# Patient Record
Sex: MALE | Race: WHITE | Employment: UNEMPLOYED | URBAN - METROPOLITAN AREA
[De-identification: names, ages, dates, MRNs, and addresses within clinical notes are randomized per-mention and may not be internally consistent; named-entity substitution may affect disease eponyms.]

---

## 2017-02-27 ENCOUNTER — HOSPITAL ENCOUNTER (INPATIENT)
Age: 47
LOS: 4 days | Discharge: HOME | DRG: 812 | End: 2017-03-04
Attending: EMERGENCY MEDICINE | Admitting: INTERNAL MEDICINE
Payer: COMMERCIAL

## 2017-02-27 ENCOUNTER — APPOINTMENT (OUTPATIENT)
Dept: GENERAL RADIOLOGY | Age: 47
DRG: 812 | End: 2017-02-27
Attending: EMERGENCY MEDICINE
Payer: COMMERCIAL

## 2017-02-27 DIAGNOSIS — S80.10XA TRAUMATIC ECCHYMOSIS OF LOWER LEG, UNSPECIFIED LATERALITY, INITIAL ENCOUNTER: ICD-10-CM

## 2017-02-27 DIAGNOSIS — D64.9 SEVERE ANEMIA: Primary | ICD-10-CM

## 2017-02-27 PROBLEM — R23.3 PETECHIAL RASH: Status: ACTIVE | Noted: 2017-02-27

## 2017-02-27 PROBLEM — T14.8XXA BRUISING: Status: ACTIVE | Noted: 2017-02-27

## 2017-02-27 LAB
ALBUMIN SERPL BCP-MCNC: 3.2 G/DL (ref 3.5–5)
ALBUMIN/GLOB SERPL: 0.5 {RATIO} (ref 1.2–3.5)
ALP SERPL-CCNC: 142 U/L (ref 50–136)
ALT SERPL-CCNC: 23 U/L (ref 12–65)
ANION GAP BLD CALC-SCNC: 10 MMOL/L (ref 7–16)
APTT PPP: 27 SEC (ref 25.3–32.9)
AST SERPL W P-5'-P-CCNC: 22 U/L (ref 15–37)
BASOPHILS # BLD AUTO: 0 K/UL (ref 0–0.2)
BASOPHILS # BLD: 0 % (ref 0–2)
BILIRUB SERPL-MCNC: 0.6 MG/DL (ref 0.2–1.1)
BUN SERPL-MCNC: 17 MG/DL (ref 6–23)
CALCIUM SERPL-MCNC: 9.5 MG/DL (ref 8.3–10.4)
CHLORIDE SERPL-SCNC: 96 MMOL/L (ref 98–107)
CK SERPL-CCNC: 67 U/L (ref 21–215)
CO2 SERPL-SCNC: 26 MMOL/L (ref 21–32)
CREAT SERPL-MCNC: 1.1 MG/DL (ref 0.8–1.5)
DIFFERENTIAL METHOD BLD: ABNORMAL
EOSINOPHIL # BLD: 0 K/UL (ref 0–0.8)
EOSINOPHIL NFR BLD: 0 % (ref 0.5–7.8)
ERYTHROCYTE [DISTWIDTH] IN BLOOD BY AUTOMATED COUNT: 16.6 % (ref 11.9–14.6)
FLUAV AG NPH QL IA: NEGATIVE
FLUBV AG NPH QL IA: NEGATIVE
GLOBULIN SER CALC-MCNC: 5.9 G/DL (ref 2.3–3.5)
GLUCOSE SERPL-MCNC: 113 MG/DL (ref 65–100)
HCT VFR BLD AUTO: 21.1 % (ref 41.1–50.3)
HGB BLD-MCNC: 6.3 G/DL (ref 13.6–17.2)
INR PPP: 1.1 (ref 0.9–1.2)
LACTATE BLD-SCNC: 3.7 MMOL/L (ref 0.5–1.9)
LYMPHOCYTES # BLD AUTO: 9 % (ref 13–44)
LYMPHOCYTES # BLD: 1.5 K/UL (ref 0.5–4.6)
MCH RBC QN AUTO: 23.2 PG (ref 26.1–32.9)
MCHC RBC AUTO-ENTMCNC: 29.9 G/DL (ref 31.4–35)
MCV RBC AUTO: 77.9 FL (ref 79.6–97.8)
MONOCYTES # BLD: 0.7 K/UL (ref 0.1–1.3)
MONOCYTES NFR BLD AUTO: 4 % (ref 4–12)
NEUTS SEG # BLD: 14.3 K/UL (ref 1.7–8.2)
NEUTS SEG NFR BLD AUTO: 87 % (ref 43–78)
PLATELET # BLD AUTO: 540 K/UL (ref 150–450)
PLATELET COMMENTS,PCOM: ABNORMAL
PMV BLD AUTO: 9.2 FL (ref 10.8–14.1)
POTASSIUM SERPL-SCNC: 5 MMOL/L (ref 3.5–5.1)
PROT SERPL-MCNC: 9.1 G/DL (ref 6.3–8.2)
PROTHROMBIN TIME: 12 SEC (ref 9.6–12)
RBC # BLD AUTO: 2.71 M/UL (ref 4.23–5.67)
RBC MORPH BLD: ABNORMAL
RBC MORPH BLD: ABNORMAL
SODIUM SERPL-SCNC: 132 MMOL/L (ref 136–145)
TSH SERPL DL<=0.005 MIU/L-ACNC: 0.79 UIU/ML (ref 0.36–3.74)
WBC # BLD AUTO: 16.5 K/UL (ref 4.3–11.1)

## 2017-02-27 PROCEDURE — 74011250636 HC RX REV CODE- 250/636: Performed by: EMERGENCY MEDICINE

## 2017-02-27 PROCEDURE — P9016 RBC LEUKOCYTES REDUCED: HCPCS | Performed by: EMERGENCY MEDICINE

## 2017-02-27 PROCEDURE — 84443 ASSAY THYROID STIM HORMONE: CPT | Performed by: EMERGENCY MEDICINE

## 2017-02-27 PROCEDURE — 82550 ASSAY OF CK (CPK): CPT | Performed by: EMERGENCY MEDICINE

## 2017-02-27 PROCEDURE — 86923 COMPATIBILITY TEST ELECTRIC: CPT | Performed by: EMERGENCY MEDICINE

## 2017-02-27 PROCEDURE — 83540 ASSAY OF IRON: CPT | Performed by: INTERNAL MEDICINE

## 2017-02-27 PROCEDURE — 87804 INFLUENZA ASSAY W/OPTIC: CPT | Performed by: EMERGENCY MEDICINE

## 2017-02-27 PROCEDURE — 85025 COMPLETE CBC W/AUTO DIFF WBC: CPT | Performed by: EMERGENCY MEDICINE

## 2017-02-27 PROCEDURE — 86900 BLOOD TYPING SEROLOGIC ABO: CPT | Performed by: EMERGENCY MEDICINE

## 2017-02-27 PROCEDURE — 36430 TRANSFUSION BLD/BLD COMPNT: CPT

## 2017-02-27 PROCEDURE — 99285 EMERGENCY DEPT VISIT HI MDM: CPT | Performed by: EMERGENCY MEDICINE

## 2017-02-27 PROCEDURE — 82728 ASSAY OF FERRITIN: CPT | Performed by: INTERNAL MEDICINE

## 2017-02-27 PROCEDURE — 80053 COMPREHEN METABOLIC PANEL: CPT | Performed by: EMERGENCY MEDICINE

## 2017-02-27 PROCEDURE — 74011250637 HC RX REV CODE- 250/637: Performed by: EMERGENCY MEDICINE

## 2017-02-27 PROCEDURE — 85610 PROTHROMBIN TIME: CPT | Performed by: EMERGENCY MEDICINE

## 2017-02-27 PROCEDURE — 85730 THROMBOPLASTIN TIME PARTIAL: CPT | Performed by: EMERGENCY MEDICINE

## 2017-02-27 PROCEDURE — 83605 ASSAY OF LACTIC ACID: CPT

## 2017-02-27 PROCEDURE — 71010 XR CHEST PORT: CPT

## 2017-02-27 PROCEDURE — 83615 LACTATE (LD) (LDH) ENZYME: CPT | Performed by: INTERNAL MEDICINE

## 2017-02-27 PROCEDURE — 83010 ASSAY OF HAPTOGLOBIN QUANT: CPT | Performed by: INTERNAL MEDICINE

## 2017-02-27 RX ORDER — OXYCODONE AND ACETAMINOPHEN 10; 325 MG/1; MG/1
2 TABLET ORAL
COMMUNITY
End: 2017-03-04

## 2017-02-27 RX ORDER — ONDANSETRON 2 MG/ML
4 INJECTION INTRAMUSCULAR; INTRAVENOUS
Status: COMPLETED | OUTPATIENT
Start: 2017-02-27 | End: 2017-02-27

## 2017-02-27 RX ORDER — SODIUM CHLORIDE 9 MG/ML
250 INJECTION, SOLUTION INTRAVENOUS AS NEEDED
Status: DISCONTINUED | OUTPATIENT
Start: 2017-02-27 | End: 2017-03-04 | Stop reason: HOSPADM

## 2017-02-27 RX ORDER — OXYCODONE AND ACETAMINOPHEN 10; 325 MG/1; MG/1
1 TABLET ORAL ONCE
Status: COMPLETED | OUTPATIENT
Start: 2017-02-27 | End: 2017-02-27

## 2017-02-27 RX ORDER — TIZANIDINE 4 MG/1
4 TABLET ORAL EVERY 4 HOURS
COMMUNITY

## 2017-02-27 RX ORDER — MORPHINE SULFATE 4 MG/ML
4 INJECTION, SOLUTION INTRAMUSCULAR; INTRAVENOUS
Status: COMPLETED | OUTPATIENT
Start: 2017-02-27 | End: 2017-02-27

## 2017-02-27 RX ORDER — PROPRANOLOL HYDROCHLORIDE 120 MG/1
120 CAPSULE, EXTENDED RELEASE ORAL
COMMUNITY

## 2017-02-27 RX ORDER — SODIUM CHLORIDE 0.9 % (FLUSH) 0.9 %
5-10 SYRINGE (ML) INJECTION AS NEEDED
Status: DISCONTINUED | OUTPATIENT
Start: 2017-02-27 | End: 2017-03-04 | Stop reason: HOSPADM

## 2017-02-27 RX ORDER — PROMETHAZINE HYDROCHLORIDE 25 MG/1
25 TABLET ORAL
COMMUNITY

## 2017-02-27 RX ORDER — MORPHINE SULFATE 10 MG/ML
5 INJECTION, SOLUTION INTRAMUSCULAR; INTRAVENOUS
Status: DISCONTINUED | OUTPATIENT
Start: 2017-02-27 | End: 2017-02-28

## 2017-02-27 RX ORDER — SODIUM CHLORIDE 0.9 % (FLUSH) 0.9 %
5-10 SYRINGE (ML) INJECTION EVERY 8 HOURS
Status: DISCONTINUED | OUTPATIENT
Start: 2017-02-27 | End: 2017-03-04 | Stop reason: HOSPADM

## 2017-02-27 RX ORDER — RABEPRAZOLE SODIUM 20 MG/1
20 TABLET, DELAYED RELEASE ORAL DAILY
COMMUNITY

## 2017-02-27 RX ADMIN — OXYCODONE HYDROCHLORIDE AND ACETAMINOPHEN 1 TABLET: 10; 325 TABLET ORAL at 22:10

## 2017-02-27 RX ADMIN — MORPHINE SULFATE 4 MG: 4 INJECTION, SOLUTION INTRAMUSCULAR; INTRAVENOUS at 23:24

## 2017-02-27 RX ADMIN — ONDANSETRON HYDROCHLORIDE 4 MG: 2 SOLUTION INTRAMUSCULAR; INTRAVENOUS at 23:23

## 2017-02-27 NOTE — IP AVS SNAPSHOT
Andrew Mendiola 
 
 
 [address] [id number] [facility] 
[phone] Patient: Abby Das MRN: [id number] :1970 You are allergic to the following Allergen Reactions Amitriptyline Other (comments)  
 hallucinations Reglan (Metoclopramide) Other (comments) Problems breathing , ,jaw locks Recent Documentation Height Weight BMI Smoking Status 1.702 m 95.3 kg 32.89 kg/m2 Never Smoker Unresulted Labs Order Current Status LUPUS ANTICOAG PANEL In process CULTURE, BLOOD Preliminary result CULTURE, BLOOD Preliminary result Emergency Contacts Name Discharge Info Relation Home Work Mobile Radha Miller  Spouse [3] [phone] About your hospitalization You were admitted on:  2017 You last received care in the:  [facility] You were discharged on:  2017 Unit phone number:  [phone] Why you were hospitalized Your primary diagnosis was:  Symptomatic Anemia Your diagnoses also included:  Petechial Rash, Bruising, Hypothyroidism, Hyponatremia, Hypertension, Pain In Both Lower Extremities, Anemia Providers Seen During Your Hospitalizations Provider Role Specialty Primary office phone Jos Herrera MD Attending Provider Emergency Medicine [phone] Alexander Cruz MD Attending Provider Emergency Medicine [phone] Fab Kilpatrick MD Attending Provider Emergency Medicine [phone] Mandi Pickard MD Attending Provider Internal Medicine [phone] Your Primary Care Physician (PCP) Primary Care Physician Office Phone Office Fax UNKNOWN, PROVIDER ** None ** ** None ** Follow-up Information Follow up With Details Comments Contact Info MD Modesto Leyva  [facility] [address] [place] [address] 
[phone] Provider Unknown   Patient not available to ask Current Discharge Medication List  
  
START taking these medications Dose & Instructions Dispensing Information Comments Morning Noon Evening Bedtime  
 0.9% sodium chloride solp 250 mL with ferric carboxymaltose 50 iron mg/mL soln 750 mg  
Start taking on:  3/8/2017 Your next dose is: Today, Tomorrow Other:  _________ Dose:  750 mg  
750 mg by IntraVENous route once for 1 dose. Quantity:  1 Dose Refills:  0  
     
   
   
   
  
 acetaminophen 325 mg tablet Commonly known as:  TYLENOL Your next dose is: Today, Tomorrow Other:  _________ Dose:  650 mg Take 2 Tabs by mouth every four (4) hours (while awake). Do not take more than 6 pills daily Quantity:  60 Tab Refills:  1  
     
   
   
   
  
 calcium carbonate 200 mg calcium (500 mg) Chew Commonly known as:  TUMS Your next dose is: Today, Tomorrow Other:  _________ Dose:  2 Tab Take 2 Tabs by mouth four (4) times daily (with meals) for 7 days. Quantity:  112 Tab Refills:  1  
     
   
   
   
  
 gabapentin 300 mg capsule Commonly known as:  NEURONTIN Your next dose is: Today, Tomorrow Other:  _________ Dose:  300 mg Take 1 Cap by mouth every eight (8) hours for 30 days. Quantity:  90 Cap Refills:  0 HYDROmorphone ER 12 mg tablet Commonly known asWaunita Cirri ER Your next dose is: Today, Tomorrow Other:  _________ Dose:  12 mg Take 1 Tab by mouth daily. Max Daily Amount: 12 mg. Quantity:  30 Tab Refills:  0  
     
   
   
   
  
 oxyCODONE IR 10 mg Tab immediate release tablet Commonly known as:  Cheryln Records Your next dose is: Today, Tomorrow Other:  _________  
   
   
 1- 2 tabs po q4hr prn pain Quantity:  180 Tab Refills:  0 CONTINUE these medications which have CHANGED Dose & Instructions Dispensing Information Comments Morning Noon Evening Bedtime  
 amLODIPine 10 mg tablet Commonly known as:  Nighat Christian What changed:  when to take this Your next dose is: Today, Tomorrow Other:  _________ Dose:  10 mg Take 1 Tab by mouth daily. Quantity:  30 Tab Refills:  0 CONTINUE these medications which have NOT CHANGED Dose & Instructions Dispensing Information Comments Morning Noon Evening Bedtime ACIPHEX 20 mg tablet Generic drug:  RABEprazole Your next dose is: Today, Tomorrow Other:  _________ Dose:  20 mg Take 20 mg by mouth daily. Indications: HEARTBURN Refills:  0 INDERAL  mg SR capsule Generic drug:  propranolol LA Your next dose is: Today, Tomorrow Other:  _________ Dose:  120 mg Take 120 mg by mouth nightly. Indications: hypertension Refills:  0  
     
   
   
   
  
 promethazine 25 mg tablet Commonly known as:  PHENERGAN Your next dose is: Today, Tomorrow Other:  _________ Dose:  25 mg Take 25 mg by mouth every six (6) hours as needed for Nausea. Refills:  0  
     
   
   
   
  
 SYNTHROID 100 mcg tablet Generic drug:  levothyroxine Your next dose is: Today, Tomorrow Other:  _________ Dose:  200 mcg Take 200 mcg by mouth nightly. Indications: hypothyroidism Refills:  0  
     
   
   
   
  
 temazepam 30 mg capsule Commonly known as:  RESTORIL Your next dose is: Today, Tomorrow Other:  _________ Dose:  30 mg Take 30 mg by mouth nightly. Indications: INSOMNIA Refills:  0  
     
   
   
   
  
 ZANAFLEX 4 mg tablet Generic drug:  tiZANidine Your next dose is: Today, Tomorrow Other:  _________ Dose:  4 mg Take 4 mg by mouth every four (4) hours. Indications: MUSCLE SPASM Refills:  0 STOP taking these medications PERCOCET  mg per tablet Generic drug:  oxyCODONE-acetaminophen Where to Get Your Medications Information on where to get these meds will be given to you by the nurse or doctor. ! Ask your nurse or doctor about these medications  
  0.9% sodium chloride solp 250 mL with ferric carboxymaltose 50 iron mg/mL soln 750 mg  
 acetaminophen 325 mg tablet  
 calcium carbonate 200 mg calcium (500 mg) Chew  
 gabapentin 300 mg capsule HYDROmorphone ER 12 mg tablet  
 oxyCODONE IR 10 mg Tab immediate release tablet Discharge Instructions Anemia: Care Instructions Your Care Instructions Anemia is a low level of red blood cells, which carry oxygen throughout your body. Many things can cause anemia. Lack of iron is one of the most common causes. Your body needs iron to make hemoglobin, a substance in red blood cells that carries oxygen from the lungs to your body's cells. Without enough iron, the body produces fewer and smaller red blood cells. As a result, your body's cells do not get enough oxygen, and you feel tired and weak. And you may have trouble concentrating. Bleeding is the most common cause of a lack of iron. You may have heavy menstrual bleeding or bleeding caused by conditions such as ulcers, hemorrhoids, or cancer. Regular use of aspirin or other anti-inflammatory medicines (such as ibuprofen) also can cause bleeding in some people. A lack of iron in your diet also can cause anemia, especially at times when the body needs more iron, such as during pregnancy, infancy, and the teen years. Your doctor may have prescribed iron pills. It may take several months of treatment for your iron levels to return to normal. Your doctor also may suggest that you eat foods that are rich in iron, such as meat and beans. There are many other causes of anemia. It is not always due to a lack of iron. Finding the specific cause of your anemia will help your doctor find the right treatment for you. Follow-up care is a key part of your treatment and safety. Be sure to make and go to all appointments, and call your doctor if you are having problems. It's also a good idea to know your test results and keep a list of the medicines you take. How can you care for yourself at home? · Take your medicines exactly as prescribed. Call your doctor if you think you are having a problem with your medicine. · If your doctor recommends iron pills, take them as directed: ¨ Try to take the pills on an empty stomach about 1 hour before or 2 hours after meals. But you may need to take iron with food to avoid an upset stomach. ¨ Do not take antacids or drink milk or caffeine drinks (such as coffee, tea, or cola) at the same time or within 2 hours of the time that you take your iron. They can make it hard for your body to absorb the iron. ¨ Vitamin C (from food or supplements) helps your body absorb iron. Try taking iron pills with a glass of orange juice or some other food that is high in vitamin C, such as citrus fruits. ¨ Iron pills may cause stomach problems, such as heartburn, nausea, diarrhea, constipation, and cramps. Be sure to drink plenty of fluids, and include fruits, vegetables, and fiber in your diet each day. Iron pills often make your bowel movements dark or green. ¨ If you forget to take an iron pill, do not take a double dose of iron the next time you take a pill. ¨ Keep iron pills out of the reach of small children. An overdose of iron can be very dangerous. · Follow your doctor's advice about eating iron-rich foods. These include red meat, shellfish, poultry, eggs, beans, raisins, whole-grain bread, and leafy green vegetables. · Steam vegetables to help them keep their iron content. When should you call for help? Call 911 anytime you think you may need emergency care. For example, call if: 
· You have symptoms of a heart attack. These may include: ¨ Chest pain or pressure, or a strange feeling in the chest. 
¨ Sweating. ¨ Shortness of breath. ¨ Nausea or vomiting. ¨ Pain, pressure, or a strange feeling in the back, neck, jaw, or upper belly or in one or both shoulders or arms. ¨ Lightheadedness or sudden weakness. ¨ A fast or irregular heartbeat. After you call 911, the  may tell you to chew 1 adult-strength or 2 to 4 low-dose aspirin. Wait for an ambulance. Do not try to drive yourself. · You passed out (lost consciousness). Call your doctor now or seek immediate medical care if: 
· You have new or increased shortness of breath. · You are dizzy or lightheaded, or you feel like you may faint. · Your fatigue and weakness continue or get worse. · You have any abnormal bleeding, such as: 
¨ Nosebleeds. ¨ Vaginal bleeding that is different (heavier, more frequent, at a different time of the month) than what you are used to. ¨ Bloody or black stools, or rectal bleeding. ¨ Bloody or pink urine. Watch closely for changes in your health, and be sure to contact your doctor if: 
· You do not get better as expected. Where can you learn more? Go to http://denise-marce.info/. Enter R301 in the search box to learn more about \"Anemia: Care Instructions. \" Current as of: February 5, 2016 Content Version: 11.1 © 5581-2584 [employer]. Care instructions adapted under license by [employer] (which disclaims liability or warranty for this information). If you have questions about a medical condition or this instruction, always ask your healthcare professional. [address] [address] any warranty or liability for your use of this information. Discharge Orders None [facility] Announcement We are excited to announce that we are making your provider's discharge notes available to you in [employer]. You will see these notes when they are completed and signed by the physician that discharged you from your recent hospital stay. If you have any questions or concerns about any information you see in [employer], please call the Health Information Department where you were seen or reach out to your Primary Care Provider for more information about your plan of care. Introducing \[facility]\"" & HEALTH SERVICES! Ariadne Mclaughlin introduces [employer] patient portal. Now you can access parts of your medical record, email your doctor's office, and request medication refills online. 1. In your internet browser, go to [email or web address] 2. Click on the First Time User? Click Here link in the Sign In box. You will see the New Member Sign Up page. 3. Enter your AlignMed Access Code exactly as it appears below. You will not need to use this code after youve completed the sign-up process. If you do not sign up before the expiration date, you must request a new code. · [employer] Access Code: [email or web address] Expires: 5/28/2017  9:41 PM 
 
4. Enter the last four digits of your Social Security Number (xxxx) and Date of Birth (mm/dd/yyyy) as indicated and click Submit. You will be taken to the next sign-up page. 5. Create a AlignMed ID. This will be your [employer] login ID and cannot be changed, so think of one that is secure and easy to remember. 6. Create a [employer] password. You can change your password at any time. 7. Enter your Password Reset Question and Answer. This can be used at a later time if you forget your password. 8. Enter your e-mail address. You will receive e-mail notification when new information is available in [address] 9. Click Sign Up. You can now view and download portions of your medical record.  
10. Click the Download Summary menu link to download a portable copy of your medical information. If you have questions, please visit the Frequently Asked Questions section of the [employer] website. Remember, MyChart is NOT to be used for urgent needs. For medical emergencies, dial 911. Now available from your iPhone and Android! General Information Please provide this summary of care documentation to your next provider. Patient Signature:  ____________________________________________________________ Date:  ____________________________________________________________  
  
[facility] Provider Signature:  ____________________________________________________________ Date:  ____________________________________________________________

## 2017-02-28 LAB
FERRITIN SERPL-MCNC: 46 NG/ML (ref 8–388)
HAPTOGLOB SERPL-MCNC: 495 MG/DL (ref 30–200)
HGB BLD-MCNC: 10.7 G/DL (ref 13.6–17.2)
HGB BLD-MCNC: 9 G/DL (ref 13.6–17.2)
IRON SATN MFR SERPL: 6 %
IRON SERPL-MCNC: 18 UG/DL (ref 35–150)
LACTATE SERPL-SCNC: 1.6 MMOL/L (ref 0.4–2)
LDH SERPL L TO P-CCNC: 394 U/L (ref 100–190)
TIBC SERPL-MCNC: 305 UG/DL (ref 250–450)

## 2017-02-28 PROCEDURE — 74011250637 HC RX REV CODE- 250/637: Performed by: INTERNAL MEDICINE

## 2017-02-28 PROCEDURE — 74011250636 HC RX REV CODE- 250/636: Performed by: INTERNAL MEDICINE

## 2017-02-28 PROCEDURE — 83605 ASSAY OF LACTIC ACID: CPT | Performed by: INTERNAL MEDICINE

## 2017-02-28 PROCEDURE — P9016 RBC LEUKOCYTES REDUCED: HCPCS | Performed by: EMERGENCY MEDICINE

## 2017-02-28 PROCEDURE — 36430 TRANSFUSION BLD/BLD COMPNT: CPT

## 2017-02-28 PROCEDURE — 74011250636 HC RX REV CODE- 250/636: Performed by: NURSE PRACTITIONER

## 2017-02-28 PROCEDURE — 77030032490 HC SLV COMPR SCD KNE COVD -B

## 2017-02-28 PROCEDURE — 74011250637 HC RX REV CODE- 250/637

## 2017-02-28 PROCEDURE — 65270000029 HC RM PRIVATE

## 2017-02-28 PROCEDURE — 85018 HEMOGLOBIN: CPT | Performed by: EMERGENCY MEDICINE

## 2017-02-28 PROCEDURE — 74011250636 HC RX REV CODE- 250/636: Performed by: EMERGENCY MEDICINE

## 2017-02-28 PROCEDURE — 87040 BLOOD CULTURE FOR BACTERIA: CPT | Performed by: EMERGENCY MEDICINE

## 2017-02-28 PROCEDURE — 36415 COLL VENOUS BLD VENIPUNCTURE: CPT | Performed by: INTERNAL MEDICINE

## 2017-02-28 PROCEDURE — 74011000258 HC RX REV CODE- 258: Performed by: EMERGENCY MEDICINE

## 2017-02-28 PROCEDURE — 30233N1 TRANSFUSION OF NONAUTOLOGOUS RED BLOOD CELLS INTO PERIPHERAL VEIN, PERCUTANEOUS APPROACH: ICD-10-PCS | Performed by: INTERNAL MEDICINE

## 2017-02-28 RX ORDER — MORPHINE SULFATE 100 MG/5ML
10 SOLUTION ORAL
Status: DISCONTINUED | OUTPATIENT
Start: 2017-02-28 | End: 2017-02-28 | Stop reason: SDUPTHER

## 2017-02-28 RX ORDER — HYDRALAZINE HYDROCHLORIDE 20 MG/ML
20 INJECTION INTRAMUSCULAR; INTRAVENOUS ONCE
Status: COMPLETED | OUTPATIENT
Start: 2017-02-28 | End: 2017-02-28

## 2017-02-28 RX ORDER — PROMETHAZINE HYDROCHLORIDE 25 MG/1
25 TABLET ORAL
Status: DISCONTINUED | OUTPATIENT
Start: 2017-02-28 | End: 2017-03-04 | Stop reason: HOSPADM

## 2017-02-28 RX ORDER — MORPHINE SULFATE 30 MG/1
90 TABLET, FILM COATED, EXTENDED RELEASE ORAL EVERY 12 HOURS
Status: DISCONTINUED | OUTPATIENT
Start: 2017-02-28 | End: 2017-02-28 | Stop reason: DRUGHIGH

## 2017-02-28 RX ORDER — HYDROMORPHONE HYDROCHLORIDE 1 MG/ML
1 INJECTION, SOLUTION INTRAMUSCULAR; INTRAVENOUS; SUBCUTANEOUS
Status: DISCONTINUED | OUTPATIENT
Start: 2017-02-28 | End: 2017-02-28

## 2017-02-28 RX ORDER — HYDROMORPHONE HYDROCHLORIDE 1 MG/ML
2 INJECTION, SOLUTION INTRAMUSCULAR; INTRAVENOUS; SUBCUTANEOUS
Status: DISCONTINUED | OUTPATIENT
Start: 2017-02-28 | End: 2017-02-28

## 2017-02-28 RX ORDER — HYDROMORPHONE HYDROCHLORIDE 1 MG/ML
1 INJECTION, SOLUTION INTRAMUSCULAR; INTRAVENOUS; SUBCUTANEOUS
Status: DISCONTINUED | OUTPATIENT
Start: 2017-02-28 | End: 2017-03-03

## 2017-02-28 RX ORDER — SODIUM CHLORIDE 9 MG/ML
75 INJECTION, SOLUTION INTRAVENOUS CONTINUOUS
Status: DISCONTINUED | OUTPATIENT
Start: 2017-02-28 | End: 2017-03-04 | Stop reason: HOSPADM

## 2017-02-28 RX ORDER — MORPHINE SULFATE 100 MG/1
100 TABLET, FILM COATED, EXTENDED RELEASE ORAL EVERY 12 HOURS
Status: DISCONTINUED | OUTPATIENT
Start: 2017-02-28 | End: 2017-02-28

## 2017-02-28 RX ORDER — AMLODIPINE BESYLATE 10 MG/1
10 TABLET ORAL
Status: DISCONTINUED | OUTPATIENT
Start: 2017-02-28 | End: 2017-03-04 | Stop reason: HOSPADM

## 2017-02-28 RX ORDER — ONDANSETRON 2 MG/ML
4 INJECTION INTRAMUSCULAR; INTRAVENOUS
Status: DISCONTINUED | OUTPATIENT
Start: 2017-02-28 | End: 2017-03-04 | Stop reason: HOSPADM

## 2017-02-28 RX ORDER — TIZANIDINE 2 MG/1
4 TABLET ORAL EVERY 6 HOURS
Status: DISCONTINUED | OUTPATIENT
Start: 2017-02-28 | End: 2017-03-04 | Stop reason: HOSPADM

## 2017-02-28 RX ORDER — PROMETHAZINE HYDROCHLORIDE 25 MG/1
TABLET ORAL
Status: COMPLETED
Start: 2017-02-28 | End: 2017-02-28

## 2017-02-28 RX ORDER — TEMAZEPAM 15 MG/1
30 CAPSULE ORAL
Status: DISCONTINUED | OUTPATIENT
Start: 2017-02-28 | End: 2017-03-04 | Stop reason: HOSPADM

## 2017-02-28 RX ORDER — MORPHINE SULFATE 60 MG/1
120 TABLET, FILM COATED, EXTENDED RELEASE ORAL EVERY 12 HOURS
Status: DISCONTINUED | OUTPATIENT
Start: 2017-02-28 | End: 2017-02-28

## 2017-02-28 RX ORDER — OXYCODONE AND ACETAMINOPHEN 10; 325 MG/1; MG/1
2 TABLET ORAL
Status: DISCONTINUED | OUTPATIENT
Start: 2017-02-28 | End: 2017-03-04

## 2017-02-28 RX ORDER — PANTOPRAZOLE SODIUM 40 MG/1
40 TABLET, DELAYED RELEASE ORAL
Status: DISCONTINUED | OUTPATIENT
Start: 2017-02-28 | End: 2017-03-04 | Stop reason: HOSPADM

## 2017-02-28 RX ORDER — LEVOTHYROXINE SODIUM 100 UG/1
200 TABLET ORAL
Status: DISCONTINUED | OUTPATIENT
Start: 2017-02-28 | End: 2017-03-04 | Stop reason: HOSPADM

## 2017-02-28 RX ORDER — MORPHINE SULFATE 15 MG/1
15 TABLET ORAL
Status: DISCONTINUED | OUTPATIENT
Start: 2017-02-28 | End: 2017-02-28

## 2017-02-28 RX ORDER — PROPRANOLOL HYDROCHLORIDE 60 MG/1
120 CAPSULE, EXTENDED RELEASE ORAL
Status: DISCONTINUED | OUTPATIENT
Start: 2017-02-28 | End: 2017-03-04 | Stop reason: HOSPADM

## 2017-02-28 RX ORDER — CALCIUM CARBONATE 200(500)MG
400 TABLET,CHEWABLE ORAL
Status: DISCONTINUED | OUTPATIENT
Start: 2017-02-28 | End: 2017-03-04 | Stop reason: HOSPADM

## 2017-02-28 RX ADMIN — OXYCODONE HYDROCHLORIDE AND ACETAMINOPHEN 2 TABLET: 10; 325 TABLET ORAL at 22:56

## 2017-02-28 RX ADMIN — TEMAZEPAM 30 MG: 15 CAPSULE ORAL at 21:09

## 2017-02-28 RX ADMIN — AMLODIPINE BESYLATE 10 MG: 10 TABLET ORAL at 06:28

## 2017-02-28 RX ADMIN — HYDROMORPHONE HYDROCHLORIDE 1 MG: 1 INJECTION, SOLUTION INTRAMUSCULAR; INTRAVENOUS; SUBCUTANEOUS at 03:01

## 2017-02-28 RX ADMIN — ONDANSETRON HYDROCHLORIDE 4 MG: 2 SOLUTION INTRAMUSCULAR; INTRAVENOUS at 18:17

## 2017-02-28 RX ADMIN — ONDANSETRON HYDROCHLORIDE 4 MG: 2 SOLUTION INTRAMUSCULAR; INTRAVENOUS at 03:00

## 2017-02-28 RX ADMIN — TIZANIDINE 4 MG: 2 TABLET ORAL at 04:24

## 2017-02-28 RX ADMIN — PROPRANOLOL HYDROCHLORIDE 120 MG: 60 CAPSULE, EXTENDED RELEASE ORAL at 21:09

## 2017-02-28 RX ADMIN — LEVOTHYROXINE SODIUM 200 MCG: 100 TABLET ORAL at 06:28

## 2017-02-28 RX ADMIN — LEVOTHYROXINE SODIUM 200 MCG: 100 TABLET ORAL at 21:09

## 2017-02-28 RX ADMIN — HYDRALAZINE HYDROCHLORIDE 20 MG: 20 INJECTION INTRAMUSCULAR; INTRAVENOUS at 10:25

## 2017-02-28 RX ADMIN — ONDANSETRON HYDROCHLORIDE 4 MG: 2 SOLUTION INTRAMUSCULAR; INTRAVENOUS at 10:52

## 2017-02-28 RX ADMIN — OXYCODONE HYDROCHLORIDE AND ACETAMINOPHEN 2 TABLET: 10; 325 TABLET ORAL at 14:41

## 2017-02-28 RX ADMIN — TIZANIDINE 4 MG: 2 TABLET ORAL at 18:17

## 2017-02-28 RX ADMIN — PROMETHAZINE HYDROCHLORIDE 25 MG: 25 TABLET ORAL at 09:28

## 2017-02-28 RX ADMIN — PROMETHAZINE HYDROCHLORIDE 25 MG: 25 TABLET ORAL at 01:30

## 2017-02-28 RX ADMIN — CALCIUM CARBONATE (ANTACID) CHEW TAB 500 MG 400 MG: 500 CHEW TAB at 21:09

## 2017-02-28 RX ADMIN — HYDROMORPHONE HYDROCHLORIDE 1 MG: 1 INJECTION, SOLUTION INTRAMUSCULAR; INTRAVENOUS; SUBCUTANEOUS at 02:00

## 2017-02-28 RX ADMIN — HYDROMORPHONE HYDROCHLORIDE 1 MG: 1 INJECTION, SOLUTION INTRAMUSCULAR; INTRAVENOUS; SUBCUTANEOUS at 21:10

## 2017-02-28 RX ADMIN — AMLODIPINE BESYLATE 10 MG: 10 TABLET ORAL at 21:10

## 2017-02-28 RX ADMIN — HYDROMORPHONE HYDROCHLORIDE 2 MG: 1 INJECTION, SOLUTION INTRAMUSCULAR; INTRAVENOUS; SUBCUTANEOUS at 09:28

## 2017-02-28 RX ADMIN — OXYCODONE HYDROCHLORIDE AND ACETAMINOPHEN 2 TABLET: 10; 325 TABLET ORAL at 05:05

## 2017-02-28 RX ADMIN — MORPHINE SULFATE 5 MG: 10 INJECTION INTRAMUSCULAR; INTRAVENOUS; SUBCUTANEOUS at 01:17

## 2017-02-28 RX ADMIN — TIZANIDINE 4 MG: 2 TABLET ORAL at 10:52

## 2017-02-28 RX ADMIN — HYDROMORPHONE HYDROCHLORIDE 2 MG: 1 INJECTION, SOLUTION INTRAMUSCULAR; INTRAVENOUS; SUBCUTANEOUS at 06:25

## 2017-02-28 RX ADMIN — CEFTRIAXONE SODIUM 1 G: 1 INJECTION, POWDER, FOR SOLUTION INTRAMUSCULAR; INTRAVENOUS at 03:39

## 2017-02-28 RX ADMIN — SODIUM CHLORIDE 125 ML/HR: 900 INJECTION, SOLUTION INTRAVENOUS at 03:38

## 2017-02-28 RX ADMIN — OXYCODONE HYDROCHLORIDE AND ACETAMINOPHEN 2 TABLET: 10; 325 TABLET ORAL at 18:17

## 2017-02-28 RX ADMIN — ONDANSETRON HYDROCHLORIDE 4 MG: 2 SOLUTION INTRAMUSCULAR; INTRAVENOUS at 06:57

## 2017-02-28 RX ADMIN — CALCIUM CARBONATE (ANTACID) CHEW TAB 500 MG 400 MG: 500 CHEW TAB at 18:17

## 2017-02-28 RX ADMIN — OXYCODONE HYDROCHLORIDE AND ACETAMINOPHEN 2 TABLET: 10; 325 TABLET ORAL at 10:52

## 2017-02-28 RX ADMIN — PROMETHAZINE HYDROCHLORIDE 25 MG: 25 TABLET ORAL at 14:41

## 2017-02-28 RX ADMIN — PROMETHAZINE HYDROCHLORIDE: 25 TABLET ORAL at 01:15

## 2017-02-28 RX ADMIN — ONDANSETRON HYDROCHLORIDE 4 MG: 2 SOLUTION INTRAMUSCULAR; INTRAVENOUS at 21:26

## 2017-02-28 NOTE — PROGRESS NOTES
Watching tv. Receiving 2nd unit of blood. Lungs clear bilaterally. Bowel sounds active. Bruising to bilateral thighs and backs of knees. Call light in reach. Bed lowered and locked.

## 2017-02-28 NOTE — PROGRESS NOTES
Hospitalist Progress Note    2017  Admit Date: 2017  9:22 PM   NAME: Albert Villa   :  1970   MRN:  [id number]   Attending: Jose Law. Wendy Luis MD  PCP:  PROVIDER UNKNOWN  Treatment Team: Attending Provider: Jose Law. Wendy Luis MD; Consulting Provider: Jaylyn Urena MD    Full code  SUBJECTIVE:   Mr. Seun Galvan is a 52 yo male who presented with c/o bilateral LE aching, back pain and HA (reports HA are chronic). Also c/o thigh bruising. Reports symptoms started on 's Day and has progressively worsened. He was found to be anemia pablo hgb 6.3, WBC 16.5, Platelets 056. Reports similar episode in  and required multiple units of PRBC. He reports being seen by Hematology, Rheumatology, Dermatology with negative work up. States the rash is chronic and initially started after a leg injury in  and has never went away. Pt requests to increase IV dilaudid to 2mg every 3 hours in addition to his home meds of percocet 20 mg every 2-4 hours and morphine po however not listed on PTA med list.  Pt requesting increase of dilaudid for HA however HA are chronic, unwilling to try non narcotic medication for HA. Hematology consulted. Denies CP, SOB, N/V, diarrhea.       Past Medical History:   Diagnosis Date    Cancer [facility])     stage 4 melanoma (REMOVED)    Endocrine disease     thyroid    Gastrointestinal disorder     ulcerative colitis, pyloric stenosis    Neurological disorder     migraines since age 10    Other ill-defined conditions(799.89)     anemia     Recent Results (from the past 24 hour(s))   CBC WITH AUTOMATED DIFF    Collection Time: 17  9:50 PM   Result Value Ref Range    WBC 16.5 (H) 4.3 - 11.1 K/uL    RBC 2.71 (L) 4.23 - 5.67 M/uL    HGB 6.3 (LL) 13.6 - 17.2 g/dL    HCT 21.1 (L) 41.1 - 50.3 %    MCV 77.9 (L) 79.6 - 97.8 FL    MCH 23.2 (L) 26.1 - 32.9 PG    MCHC 29.9 (L) 31.4 - 35.0 g/dL    RDW 16.6 (H) 11.9 - 14.6 %    PLATELET 702 (H) 757 - 450 K/uL    MPV 9.2 (L) 10.8 - 14.1 FL    NEUTROPHILS 87 (H) 43 - 78 %    LYMPHOCYTES 9 (L) 13 - 44 %    MONOCYTES 4 4.0 - 12.0 %    EOSINOPHILS 0 (L) 0.5 - 7.8 %    BASOPHILS 0 0.0 - 2.0 %    ABS. NEUTROPHILS 14.3 (H) 1.7 - 8.2 K/UL    ABS. LYMPHOCYTES 1.5 0.5 - 4.6 K/UL    ABS. MONOCYTES 0.7 0.1 - 1.3 K/UL    ABS. EOSINOPHILS 0.0 0.0 - 0.8 K/UL    ABS. BASOPHILS 0.0 0.0 - 0.2 K/UL    RBC COMMENTS MODERATE  HYPOCHROMIA        RBC COMMENTS MODERATE  MICROCYTOSIS        PLATELET COMMENTS INCREASED      DF MANUAL     METABOLIC PANEL, COMPREHENSIVE    Collection Time: 02/27/17  9:50 PM   Result Value Ref Range    Sodium 132 (L) 136 - 145 mmol/L    Potassium 5.0 3.5 - 5.1 mmol/L    Chloride 96 (L) 98 - 107 mmol/L    CO2 26 21 - 32 mmol/L    Anion gap 10 7 - 16 mmol/L    Glucose 113 (H) 65 - 100 mg/dL    BUN 17 6 - 23 MG/DL    Creatinine 1.10 0.8 - 1.5 MG/DL    GFR est AA >60 >60 ml/min/1.73m2    GFR est non-AA >60 >60 ml/min/1.73m2    Calcium 9.5 8.3 - 10.4 MG/DL    Bilirubin, total 0.6 0.2 - 1.1 MG/DL    ALT (SGPT) 23 12 - 65 U/L    AST (SGOT) 22 15 - 37 U/L    Alk.  phosphatase 142 (H) 50 - 136 U/L    Protein, total 9.1 (H) 6.3 - 8.2 g/dL    Albumin 3.2 (L) 3.5 - 5.0 g/dL    Globulin 5.9 (H) 2.3 - 3.5 g/dL    A-G Ratio 0.5 (L) 1.2 - 3.5     PROTHROMBIN TIME + INR    Collection Time: 02/27/17  9:50 PM   Result Value Ref Range    Prothrombin time 12.0 9.6 - 12.0 sec    INR 1.1 0.9 - 1.2     PTT    Collection Time: 02/27/17  9:50 PM   Result Value Ref Range    aPTT 27.0 25.3 - 32.9 SEC   TYPE & SCREEN    Collection Time: 02/27/17  9:50 PM   Result Value Ref Range    Crossmatch Expiration 03/02/2017     ABO/Rh(D) A POSITIVE     Antibody screen NEG     Unit number [id number]     Blood component type  LR AS5     Unit division 00     Status of unit ISSUED     Crossmatch result Compatible     Unit number [id number]     Blood component type  LR AS5     Unit division 00     Status of unit TRANSFUSED     Crossmatch result Compatible    TSH 3RD GENERATION    Collection Time: 02/27/17  9:50 PM   Result Value Ref Range    TSH 0.794 0.358 - 3.740 uIU/mL   CK    Collection Time: 02/27/17  9:50 PM   Result Value Ref Range    CK 67 21 - 215 U/L   LD    Collection Time: 02/27/17  9:50 PM   Result Value Ref Range     (H) 100 - 190 U/L   HAPTOGLOBIN    Collection Time: 02/27/17  9:50 PM   Result Value Ref Range    Haptoglobin 495 (H) 30 - 200 mg/dL   FERRITIN    Collection Time: 02/27/17  9:50 PM   Result Value Ref Range    Ferritin 46 8 - 388 NG/ML   TRANSFERRIN SATURATION    Collection Time: 02/27/17  9:50 PM   Result Value Ref Range    Iron 18 (L) 35 - 150 ug/dL    TIBC 305 250 - 450 ug/dL    Transferrin Saturation 6 (L) >20 %   POC LACTIC ACID    Collection Time: 02/27/17  9:58 PM   Result Value Ref Range    Lactic Acid (POC) 3.7 (H) 0.5 - 1.9 mmol/L   INFLUENZA A & B AG (RAPID TEST)    Collection Time: 02/27/17 11:10 PM   Result Value Ref Range    Influenza A Ag NEGATIVE  NEG      Influenza B Ag NEGATIVE  NEG       Allergies   Allergen Reactions    Amitriptyline Other (comments)     hallucinations    Reglan [Metoclopramide] Other (comments)     Problems breathing , ,jaw locks     Current Facility-Administered Medications   Medication Dose Route Frequency Provider Last Rate Last Dose    amLODIPine (NORVASC) tablet 10 mg  10 mg Oral QHS Jacquelyn Pineda MD   10 mg at 02/28/17 2201    levothyroxine (SYNTHROID) tablet 200 mcg  200 mcg Oral QHS Jacquelyn Pineda MD   200 mcg at 02/28/17 4666    oxyCODONE-acetaminophen (PERCOCET 10)  mg per tablet 2 Tab  2 Tab Oral Q4H PRN Jacquelyn Pineda MD   2 Tab at 02/28/17 0505    promethazine (PHENERGAN) tablet 25 mg  25 mg Oral Q6H PRN Jacquelyn Pineda MD   25 mg at 02/28/17 7272    propranolol LA (INDERAL LA) capsule 120 mg  120 mg Oral QHS Jacquelyn Pineda MD        pantoprazole (PROTONIX) tablet 40 mg  40 mg Oral ACB Jacquelyn Pineda MD        temazepam (RESTORIL) capsule 30 mg  30 mg Oral QHS Blossom Bray Natali Rosa MD        tiZANidine (ZANAFLEX) tablet 4 mg  4 mg Oral Q6H Esdras Sheppard MD   4 mg at 17 0424    0.9% sodium chloride infusion  125 mL/hr IntraVENous CONTINUOUS Alvenia Carlos. Aravind Sheppard  mL/hr at 17 0338 125 mL/hr at 17 0338    ondansetron (ZOFRAN) injection 4 mg  4 mg IntraVENous Q4H PRN Alvereggie Gonzalez. Aravind Sheppard MD   4 mg at 17 6148    sodium chloride (NS) flush 5-10 mL  5-10 mL IntraVENous Q8H Kayla Bryson III, MD        sodium chloride (NS) flush 5-10 mL  5-10 mL IntraVENous PRN Kayla Bryson III, MD        0.9% sodium chloride infusion 250 mL  250 mL IntraVENous PRN Trisha Quiroga MD        sodium chloride 0.9 % bolus infusion 1,000 mL  1,000 mL IntraVENous ONCE Trisha Quiroga MD           Review of Systems negative with exception of pertinent positives noted above  PHYSICAL EXAM     Visit Vitals    BP (!) 179/101    Pulse 86    Temp 96.6 °F (35.9 °C)    Resp 18    Ht 5' 7\" (1.702 m)    Wt 95.3 kg (210 lb)    SpO2 100%    BMI 32.89 kg/m2      Temp (24hrs), Av.7 °F (37.1 °C), Min:96.6 °F (35.9 °C), Max:100.5 °F (38.1 °C)    Oxygen Therapy  O2 Sat (%): 100 % (17 08)  Pulse via Oximetry: 95 beats per minute (17 0020)  O2 Device: Room air (17 0039)    Intake/Output Summary (Last 24 hours) at 17  Last data filed at 17 08   Gross per 24 hour   Intake              675 ml   Output              450 ml   Net              225 ml      General: No acute distress    Lungs: CTA bilaterally. Resp even and nonlabored  Heart:  S1S2 present without murmurs rubs gallops. RRR. No edema  Abdomen: Soft, non tender, non distended, BS present. Extremities: Moves ext well  Neurologic:  No focal deficits  Psych: A/O X3  Skin:   Petechial papules bilateral LE.   Bruising bilateral upper thighs      Results summary of Diagnostic Studies/Procedures copied from within [facility] EMR:         Brett Quigley    Diagnosis Date Noted    Symptomatic anemia 02/27/2017    Petechial rash 02/27/2017    Bruising 02/27/2017    Hyponatremia 07/19/2014    Hypertension 07/17/2014    Hypothyroidism 07/09/2014    Pain in both lower extremities 07/09/2014     left       Plan:    Symptomatic anemia: pt received 2 units PRBCs, awaiting CBC result today. Hematology consulted. Petechial rash/bruising: Hematology consulted. May need Dermatology for guidance in the future    HTN: chronic, elevated.  Hydralazine prn      Time spent with pt 30 min  Notes, labs, VS, diagnostic testing reviewed    DVT Prophylaxis: contraindicated (no anticoagulation due to anemia, no SCDs due to unknown etiology of LE bruising)  Plan of Care Discussed with: Supervising MD Dr. Elda Tony, care team, pt  Oumou Zamora NP

## 2017-02-28 NOTE — ED NOTES
Pt refused to have blood cultures and LOC drawn stating \"last time they stuck me 14 times to get blood. The hospitalist said they would do a pic line so they can get the blood then. \"

## 2017-02-28 NOTE — ED NOTES
TRANSFER - OUT REPORT:    Verbal report given to [address] (name) on Storm Estimable  being transferred to [address] [place] [address] (unit) for routine progression of care       Report consisted of patients Situation, Background, Assessment and   Recommendations(SBAR). Information from the following report(s) ED Summary was reviewed with the receiving nurse. Lines:   Peripheral IV 02/27/17 Left Antecubital (Active)   Site Assessment Clean, dry, & intact 2/27/2017  9:50 PM   Phlebitis Assessment 0 2/27/2017  9:50 PM   Infiltration Assessment 0 2/27/2017  9:50 PM   Dressing Status Clean, dry, & intact 2/27/2017  9:50 PM   Dressing Type Transparent 2/27/2017  9:50 PM        Opportunity for questions and clarification was provided.       Patient transported with:   Registered Nurse

## 2017-02-28 NOTE — ED NOTES
Pt lips and skin are very white. Pt was placed on O2 2L at triage. Pt did not want to take his pain meds stating that they havent been working and he wants something stronger. Spoke with physician and Dr. Deborah Davies stated he is not getting anything stronger until all test results come back.   Pt was advised and decided to take norco.

## 2017-02-28 NOTE — PROGRESS NOTES
Pt arrived in 315 via stretcher and has a unit of RBC's still infusing. Wife at the bedside. Pt is c/o severe pain,  states the morphine he got in the Er did nothing. At this time all he has ordered is percocet and morphine 5 mg and unable to get them yet. Oriented to the room, bed control buttons. And plan of care.

## 2017-02-28 NOTE — PROGRESS NOTES
TRANSFER - IN REPORT:    Verbal report received from Shelley Severin) on [address]  being received from ER(unit) for routine progression of care      Report consisted of patients Situation, Background, Assessment and   Recommendations(SBAR). Information from the following report(s) SBAR, Kardex, ED Summary, [facility] and Recent Results was reviewed with the receiving nurse. Opportunity for questions and clarification was provided. Assessment completed upon patients arrival to unit and care assumed.

## 2017-02-28 NOTE — PROGRESS NOTES
Patient vomited a small amount of clear fluid in emesis bag. Lizeth ALEJO states that is was clear with no solid particles. Patient states that he vomited the pain pills up. Informed patient that there were no pills in the bag.

## 2017-02-28 NOTE — PROGRESS NOTES
Completed 2nd unit of blood. Pt states he needs his pain medication to be increased. States blood pressure increases when he needs pain medication. Pt states he takes 20 mg of Percocet every 2-4 hours and has morphine pills to take for additional pain at home. Watching tv. Call light in reach.

## 2017-02-28 NOTE — H&P
HOSPITALIST H&P/CONSULT  NAME:  Bolivar Herbert   Age:  51 y.o.  :   1970   MRN:   [id number]  PCP: PROVIDER Chante Alanis  Consulting MD:  Treatment Team: Primary Nurse: Umu Dale RN  HPI:   Twyla Brizuela is a 50 yo M with pmhx of unexplained symptomatic anemia and thigh bruising, HTN, surgical hypothyroidism, who presents with 2 weeks of worsening bilateral thigh/leg aches, back pain, and headaches. Symptoms started on Palomares's Day and has worsened. He has had mild cough and shortness of breath, but he denies fevers at home or any other infectious symptoms. He is in significant discomfort currently and is requiring IV morphine for pain control. The last time this happened was in . He stayed in the hospital nearly 2 weeks in  and required 7 pRBC transfusions over this time. He was seen by hematology, rheumatology, and dermatology. All work up negative. Upon further discussion, he has a palpable rash that has been present since a bad L leg injury while skiing in . He had plates put in leg at that time. Rash occurred shortly thereafter and flared significantly in  and presently when he had the anemia and bruising episodes. He denies any recent trauma prior to the leg bruising. Complete ROS done and is as stated in HPI or otherwise negative  Past Medical History:   Diagnosis Date    Cancer ([facility] [address]     stage 4 melanoma (REMOVED)    Endocrine disease     thyroid    Gastrointestinal disorder     ulcerative colitis, pyloric stenosis    Neurological disorder     migraines since age 10    Other ill-defined conditions(799.89)     anemia      Past Surgical History:   Procedure Laterality Date    HX ORTHOPAEDIC      left leg/ left hip    HX OTHER SURGICAL      thyroidectomy      Prior to Admission Medications   Prescriptions Last Dose Informant Patient Reported? Taking? RABEprazole (ACIPHEX) 20 mg tablet   Yes Yes   Sig: Take 20 mg by mouth daily.  Indications: HEARTBURN amLODIPine (NORVASC) 10 mg tablet   No No   Sig: Take 1 Tab by mouth daily. Patient taking differently: Take 10 mg by mouth nightly. Indications: hypertension   levothyroxine (SYNTHROID) 100 mcg tablet   Yes No   Sig: Take 200 mcg by mouth nightly. Indications: hypothyroidism   oxyCODONE-acetaminophen (PERCOCET)  mg per tablet   Yes Yes   Sig: Take 2 Tabs by mouth every four (4) hours as needed for Pain. promethazine (PHENERGAN) 25 mg tablet   Yes Yes   Sig: Take 25 mg by mouth every six (6) hours as needed for Nausea. propranolol LA (INDERAL LA) 120 mg SR capsule   Yes Yes   Sig: Take 120 mg by mouth nightly. Indications: hypertension   temazepam (RESTORIL) 30 mg capsule   Yes No   Sig: Take 30 mg by mouth nightly. Indications: INSOMNIA   tiZANidine (ZANAFLEX) 4 mg tablet   Yes Yes   Sig: Take 4 mg by mouth every four (4) hours. Indications: MUSCLE SPASM      Facility-Administered Medications: None     Allergies   Allergen Reactions    Amitriptyline Other (comments)     hallucinations    Reglan [Metoclopramide] Other (comments)     Problems breathing , ,jaw locks      Social History   Substance Use Topics    Smoking status: Never Smoker    Smokeless tobacco: Not on file    Alcohol use No      No family history on file. Objective:     Visit Vitals    /89    Pulse 99    Temp 100.3 °F (37.9 °C)    Resp 18    Ht 5' 7\" (1.702 m)    Wt 95.3 kg (210 lb)    SpO2 99%    BMI 32.89 kg/m2      Temp (24hrs), Av.3 °F (37.9 °C), Min:100.3 °F (37.9 °C), Max:100.3 °F (37.9 °C)    Oxygen Therapy  O2 Sat (%): 99 % (17)  Pulse via Oximetry: 92 beats per minute (17)  O2 Device: Room air (17)  Physical Exam:  General:    Alert, cooperative, appears uncomfortable    Head:   Normocephalic, without obvious abnormality, atraumatic. Nose:  Nares normal. No drainage or sinus tenderness. Lungs:   Clear to auscultation bilaterally. No Wheezing or Rhonchi.  No rales.  Heart:   Regular rate and rhythm,  no murmur, rub or gallop. Abdomen:   Soft, non-tender. Not distended. Bowel sounds normal.   Extremities: Palpable petechial rash of both legs, significant bruising of leg and thigh near popliteal fossa  Skin:     Palpable petechial rash of legs. Not Jaundiced  Neurologic: Alert and oriented x 3, no focal deficits   Data Review:   Recent Results (from the past 24 hour(s))   CBC WITH AUTOMATED DIFF    Collection Time: 02/27/17  9:50 PM   Result Value Ref Range    WBC 16.5 (H) 4.3 - 11.1 K/uL    RBC 2.71 (L) 4.23 - 5.67 M/uL    HGB 6.3 (LL) 13.6 - 17.2 g/dL    HCT 21.1 (L) 41.1 - 50.3 %    MCV 77.9 (L) 79.6 - 97.8 FL    MCH 23.2 (L) 26.1 - 32.9 PG    MCHC 29.9 (L) 31.4 - 35.0 g/dL    RDW 16.6 (H) 11.9 - 14.6 %    PLATELET 800 (H) 004 - 450 K/uL    MPV 9.2 (L) 10.8 - 14.1 FL    NEUTROPHILS 87 (H) 43 - 78 %    LYMPHOCYTES 9 (L) 13 - 44 %    MONOCYTES 4 4.0 - 12.0 %    EOSINOPHILS 0 (L) 0.5 - 7.8 %    BASOPHILS 0 0.0 - 2.0 %    ABS. NEUTROPHILS 14.3 (H) 1.7 - 8.2 K/UL    ABS. LYMPHOCYTES 1.5 0.5 - 4.6 K/UL    ABS. MONOCYTES 0.7 0.1 - 1.3 K/UL    ABS. EOSINOPHILS 0.0 0.0 - 0.8 K/UL    ABS. BASOPHILS 0.0 0.0 - 0.2 K/UL    RBC COMMENTS MODERATE  HYPOCHROMIA        RBC COMMENTS MODERATE  MICROCYTOSIS        PLATELET COMMENTS INCREASED      DF MANUAL     METABOLIC PANEL, COMPREHENSIVE    Collection Time: 02/27/17  9:50 PM   Result Value Ref Range    Sodium 132 (L) 136 - 145 mmol/L    Potassium 5.0 3.5 - 5.1 mmol/L    Chloride 96 (L) 98 - 107 mmol/L    CO2 26 21 - 32 mmol/L    Anion gap 10 7 - 16 mmol/L    Glucose 113 (H) 65 - 100 mg/dL    BUN 17 6 - 23 MG/DL    Creatinine 1.10 0.8 - 1.5 MG/DL    GFR est AA >60 >60 ml/min/1.73m2    GFR est non-AA >60 >60 ml/min/1.73m2    Calcium 9.5 8.3 - 10.4 MG/DL    Bilirubin, total 0.6 0.2 - 1.1 MG/DL    ALT (SGPT) 23 12 - 65 U/L    AST (SGOT) 22 15 - 37 U/L    Alk.  phosphatase 142 (H) 50 - 136 U/L    Protein, total 9.1 (H) 6.3 - 8.2 g/dL Albumin 3.2 (L) 3.5 - 5.0 g/dL    Globulin 5.9 (H) 2.3 - 3.5 g/dL    A-G Ratio 0.5 (L) 1.2 - 3.5     PROTHROMBIN TIME + INR    Collection Time: 02/27/17  9:50 PM   Result Value Ref Range    Prothrombin time 12.0 9.6 - 12.0 sec    INR 1.1 0.9 - 1.2     PTT    Collection Time: 02/27/17  9:50 PM   Result Value Ref Range    aPTT 27.0 25.3 - 32.9 SEC   TYPE & SCREEN    Collection Time: 02/27/17  9:50 PM   Result Value Ref Range    Crossmatch Expiration 03/02/2017     ABO/Rh(D) A POSITIVE     Antibody screen NEG     Unit number [id number]     Blood component type  LR AS5     Unit division 00     Status of unit ALLOCATED     Crossmatch result Compatible     Unit number [id number]     Blood component type [facility] AS5     Unit division 00     Status of unit ISSUED     Crossmatch result Compatible    TSH 3RD GENERATION    Collection Time: 02/27/17  9:50 PM   Result Value Ref Range    TSH 0.794 0.358 - 3.740 uIU/mL   CK    Collection Time: 02/27/17  9:50 PM   Result Value Ref Range    CK 67 21 - 215 U/L   POC LACTIC ACID    Collection Time: 02/27/17  9:58 PM   Result Value Ref Range    Lactic Acid (POC) 3.7 (H) 0.5 - 1.9 mmol/L   INFLUENZA A & B AG (RAPID TEST)    Collection Time: 02/27/17 11:10 PM   Result Value Ref Range    Influenza A Ag NEGATIVE  NEG      Influenza B Ag NEGATIVE  NEG       Imaging /Procedures /Studies     Assessment and Plan: Active Hospital Problems    Diagnosis Date Noted    Symptomatic anemia 02/27/2017    Petechial rash 02/27/2017    Bruising 02/27/2017    Hyponatremia 07/19/2014    Hypertension 07/17/2014    Hypothyroidism 07/09/2014       PLAN  ·  Admit to medical bed. Transfuse 2 units pRBC. · Check anemia labs. · Consult hematology for re-evaluation. · IVF. · Pain control- requiring IV morphine. · Continue antibiotics if needed. · Anticoagulation not safe due to leg bruising and SCDs not able to be worn due to leg pain.   · Due to frequent blood draws, needle phobia, and comfort, he is requesting a PICC. This has been ordered for tomorrow. Code Status: Full    Anticipated discharge: 3-4 days    Signed By: Zita Torres.  Aydee Valenzuela MD     February 27, 2017

## 2017-02-28 NOTE — PROGRESS NOTES
Spoke with Dr Jennifer Mcnamara about patient request to see him. MD will round on patient before she leaves.

## 2017-02-28 NOTE — ED PROVIDER NOTES
HPI Comments: 79-year-old gentleman has used her chronic pain due to lower extremity fractures, ulcers colitis in remission, melanoma, hypothyroidism. 2 week history of increasing achiness in his legs with increasing shortness of breath and weakness. Distal exertion. Vomiting for the last 2 days. He's had no diarrhea no melena or obvious blood in his stool or his emesis. Also has a history of anemia which required a number of transfusions. Etiology of this was unclear and occurred about 3 years ago. Has started with increasing posterior thigh and calf pain on both sides. Noticed ecchymosis on both sides of his legs as well. Patient is a 51 y.o. male presenting with shortness of breath and ecchymosis. The history is provided by the patient. Shortness of Breath   This is a new problem. The current episode started more than 2 days ago. The problem has been gradually worsening. Associated symptoms include cough, orthopnea, vomiting, rash and leg pain. Pertinent negatives include no fever, no headaches, no rhinorrhea, no ear pain, no neck pain, no sputum production, no hemoptysis, no wheezing, no PND, no chest pain, no syncope, no abdominal pain and no leg swelling. Associated medical issues do not include asthma, COPD, PE, CAD, heart failure, past MI, DVT or recent surgery. Bleeding/Bruising   Associated symptoms include shortness of breath. Pertinent negatives include no chest pain, no abdominal pain and no headaches. Past Medical History:   Diagnosis Date    Cancer [facility])     stage 4 melanoma (REMOVED)    Endocrine disease     thyroid    Gastrointestinal disorder     ulcerative colitis, pyloric stenosis    Neurological disorder     migraines since age 10    Other ill-defined conditions(799.89)     anemia       Past Surgical History:   Procedure Laterality Date    HX ORTHOPAEDIC      left leg/ left hip    HX OTHER SURGICAL      thyroidectomy         No family history on file.     Social History     Social History    Marital status:      Spouse name: N/A    Number of children: N/A    Years of education: N/A     Occupational History    Not on file. Social History Main Topics    Smoking status: Never Smoker    Smokeless tobacco: Not on file    Alcohol use No    Drug use: No    Sexual activity: Not on file     Other Topics Concern    Not on file     Social History Narrative         ALLERGIES: Amitriptyline and Reglan [metoclopramide]    Review of Systems   Constitutional: Negative for chills and fever. HENT: Negative for ear pain and rhinorrhea. Respiratory: Positive for cough and shortness of breath. Negative for hemoptysis, sputum production and wheezing. Cardiovascular: Positive for orthopnea. Negative for chest pain, palpitations, leg swelling, syncope and PND. Gastrointestinal: Positive for nausea and vomiting. Negative for abdominal pain, blood in stool, constipation and diarrhea. Genitourinary: Negative for dysuria and flank pain. Musculoskeletal: Negative for back pain and neck pain. Skin: Positive for rash. Negative for color change. Neurological: Negative for syncope and headaches. All other systems reviewed and are negative. Vitals:    02/27/17 2126   BP: 109/74   Pulse: 99   Resp: 18   SpO2: 100%   Weight: 95.3 kg (210 lb)   Height: 5' 7\" (1.702 m)            Physical Exam   Constitutional: He is oriented to person, place, and time. He appears well-developed and well-nourished. No distress. Extremely pale   HENT:   Head: Normocephalic and atraumatic. Mouth/Throat: Oropharynx is clear and moist. No oropharyngeal exudate. Eyes: EOM are normal. Pupils are equal, round, and reactive to light. Pale conjunctiva   Neck: Normal range of motion. Neck supple. Cardiovascular: Normal rate, regular rhythm and intact distal pulses. No murmur heard. Pulmonary/Chest: Breath sounds normal. No respiratory distress. Abdominal: Soft.  Bowel sounds are normal. He exhibits no mass. There is no tenderness. There is no rebound and no guarding. No hernia. Genitourinary: Rectal exam shows no mass, no tenderness and guaiac negative stool. Neurological: He is alert and oriented to person, place, and time. Gait normal.   Nl speech   Skin: Skin is warm and dry. Chronic maculopapular rash to both lower extremities anteriorly. Ecchymosis on posterior thigh to the popliteal fossa and proximal calves. Slight erythema but no obvious evidence for infection. Psychiatric: He has a normal mood and affect. His speech is normal.   Nursing note and vitals reviewed. MDM  Number of Diagnoses or Management Options  Diagnosis management comments: Concern for anemia, acute renal failure, rhabdomyolysis. Also high output congestive heart failure.,  Dysrhythmia, pneumonia or effusion. Obtain x-ray and EKG also troponin along with screening blood work.        Amount and/or Complexity of Data Reviewed  Clinical lab tests: ordered and reviewed  Tests in the radiology section of CPT®: reviewed and ordered  Tests in the medicine section of CPT®: ordered and reviewed  Review and summarize past medical records: yes (Admission July 2014 for anemia, myositis.)  Independent visualization of images, tracings, or specimens: yes    Risk of Complications, Morbidity, and/or Mortality  Presenting problems: moderate  Diagnostic procedures: low  Management options: moderate    Patient Progress  Patient progress: stable    ED Course       Procedures    Results Include:    Recent Results (from the past 24 hour(s))   CBC WITH AUTOMATED DIFF    Collection Time: 02/27/17  9:50 PM   Result Value Ref Range    WBC 16.5 (H) 4.3 - 11.1 K/uL    RBC 2.71 (L) 4.23 - 5.67 M/uL    HGB 6.3 (LL) 13.6 - 17.2 g/dL    HCT 21.1 (L) 41.1 - 50.3 %    MCV 77.9 (L) 79.6 - 97.8 FL    MCH 23.2 (L) 26.1 - 32.9 PG    MCHC 29.9 (L) 31.4 - 35.0 g/dL    RDW 16.6 (H) 11.9 - 14.6 %    PLATELET 134 (H) 107 - 450 K/uL MPV 9.2 (L) 10.8 - 14.1 FL    NEUTROPHILS 87 (H) 43 - 78 %    LYMPHOCYTES 9 (L) 13 - 44 %    MONOCYTES 4 4.0 - 12.0 %    EOSINOPHILS 0 (L) 0.5 - 7.8 %    BASOPHILS 0 0.0 - 2.0 %    ABS. NEUTROPHILS 14.3 (H) 1.7 - 8.2 K/UL    ABS. LYMPHOCYTES 1.5 0.5 - 4.6 K/UL    ABS. MONOCYTES 0.7 0.1 - 1.3 K/UL    ABS. EOSINOPHILS 0.0 0.0 - 0.8 K/UL    ABS. BASOPHILS 0.0 0.0 - 0.2 K/UL    RBC COMMENTS MODERATE  HYPOCHROMIA        RBC COMMENTS MODERATE  MICROCYTOSIS        PLATELET COMMENTS INCREASED      DF MANUAL     METABOLIC PANEL, COMPREHENSIVE    Collection Time: 02/27/17  9:50 PM   Result Value Ref Range    Sodium 132 (L) 136 - 145 mmol/L    Potassium 5.0 3.5 - 5.1 mmol/L    Chloride 96 (L) 98 - 107 mmol/L    CO2 26 21 - 32 mmol/L    Anion gap 10 7 - 16 mmol/L    Glucose 113 (H) 65 - 100 mg/dL    BUN 17 6 - 23 MG/DL    Creatinine 1.10 0.8 - 1.5 MG/DL    GFR est AA >60 >60 ml/min/1.73m2    GFR est non-AA >60 >60 ml/min/1.73m2    Calcium 9.5 8.3 - 10.4 MG/DL    Bilirubin, total 0.6 0.2 - 1.1 MG/DL    ALT (SGPT) 23 12 - 65 U/L    AST (SGOT) 22 15 - 37 U/L    Alk.  phosphatase 142 (H) 50 - 136 U/L    Protein, total 9.1 (H) 6.3 - 8.2 g/dL    Albumin 3.2 (L) 3.5 - 5.0 g/dL    Globulin 5.9 (H) 2.3 - 3.5 g/dL    A-G Ratio 0.5 (L) 1.2 - 3.5     PROTHROMBIN TIME + INR    Collection Time: 02/27/17  9:50 PM   Result Value Ref Range    Prothrombin time 12.0 9.6 - 12.0 sec    INR 1.1 0.9 - 1.2     PTT    Collection Time: 02/27/17  9:50 PM   Result Value Ref Range    aPTT 27.0 25.3 - 32.9 SEC   TYPE & SCREEN    Collection Time: 02/27/17  9:50 PM   Result Value Ref Range    Crossmatch Expiration 03/02/2017     ABO/Rh(D) A POSITIVE     Antibody screen NEG    TSH 3RD GENERATION    Collection Time: 02/27/17  9:50 PM   Result Value Ref Range    TSH 0.794 0.358 - 3.740 uIU/mL   CK    Collection Time: 02/27/17  9:50 PM   Result Value Ref Range    CK 67 21 - 215 U/L   POC LACTIC ACID    Collection Time: 02/27/17  9:58 PM   Result Value Ref Range Lactic Acid (POC) 3.7 (H) 0.5 - 1.9 mmol/L     Xr Chest Port    Result Date: 2/27/2017  Portable chest xray  COMPARISON: July 14, 2014. CLINICAL HISTORY: Shortness of breath. Fluid swelling. FINDINGS: Lungs are underinflated. No focal consolidation, pleural effusion or pneumothorax. No pulmonary edema. Cardiac mediastinal contour is within normal limits. Surrounding bones are unremarkable. There are stable clips in the right axilla. IMPRESSION: No pulmonary consolidation or pulmonary edema. I believe lactate is elevated due to the patient's anemia and decreased oxygen transport capacity. No evidence for sepsis. Were discussed with the hospitalist for admission.   Blood has been ordered

## 2017-02-28 NOTE — PROGRESS NOTES
5 mg morphine given SIVP for pain rated 10/10.  states he feels nauseated. 25 mg phenergan given PO. Gave pt a ginger ale and told him to just sip it.

## 2017-02-28 NOTE — ED NOTES
Pt presents to ER via EMS for increased pain to B LE that has been persisting x 1 week w/o relief from percocet, bruising to B thighs w/o trauma x 3 days. Pt states he has been SOB as well x 3 days, very pale, denies any dark stools, N/V x 1-2 days w/o blood or coffee ground appearance.

## 2017-03-01 LAB
ABO + RH BLD: NORMAL
ANION GAP BLD CALC-SCNC: 16 MMOL/L (ref 7–16)
BASOPHILS # BLD AUTO: 0.1 K/UL (ref 0–0.2)
BASOPHILS # BLD: 1 % (ref 0–2)
BLD PROD TYP BPU: NORMAL
BLD PROD TYP BPU: NORMAL
BLOOD GROUP ANTIBODIES SERPL: NORMAL
BPU ID: NORMAL
BPU ID: NORMAL
BUN SERPL-MCNC: 17 MG/DL (ref 6–23)
CALCIUM SERPL-MCNC: 9.1 MG/DL (ref 8.3–10.4)
CHLORIDE SERPL-SCNC: 102 MMOL/L (ref 98–107)
CO2 SERPL-SCNC: 20 MMOL/L (ref 21–32)
CREAT SERPL-MCNC: 0.99 MG/DL (ref 0.8–1.5)
CROSSMATCH RESULT,%XM: NORMAL
CROSSMATCH RESULT,%XM: NORMAL
CRP SERPL-MCNC: 7.7 MG/DL (ref 0–0.9)
DIFFERENTIAL METHOD BLD: ABNORMAL
EOSINOPHIL # BLD: 0 K/UL (ref 0–0.8)
EOSINOPHIL NFR BLD: 0 % (ref 0.5–7.8)
ERYTHROCYTE [DISTWIDTH] IN BLOOD BY AUTOMATED COUNT: 16.6 % (ref 11.9–14.6)
ERYTHROCYTE [SEDIMENTATION RATE] IN BLOOD: 96 MM/HR (ref 0–20)
GLUCOSE SERPL-MCNC: 112 MG/DL (ref 65–100)
HCT VFR BLD AUTO: 26.1 % (ref 41.1–50.3)
HGB BLD-MCNC: 8.3 G/DL (ref 13.6–17.2)
HGB BLD-MCNC: 8.6 G/DL (ref 13.6–17.2)
HGB BLD-MCNC: 8.7 G/DL (ref 13.6–17.2)
HGB RETIC QN AUTO: 16 PG (ref 29–35)
IMM RETICS NFR: 27 % (ref 2.3–13.4)
LYMPHOCYTES # BLD AUTO: 26 % (ref 13–44)
LYMPHOCYTES # BLD: 2.7 K/UL (ref 0.5–4.6)
MCH RBC QN AUTO: 25.2 PG (ref 26.1–32.9)
MCHC RBC AUTO-ENTMCNC: 31.8 G/DL (ref 31.4–35)
MCV RBC AUTO: 79.1 FL (ref 79.6–97.8)
MONOCYTES # BLD: 1.1 K/UL (ref 0.1–1.3)
MONOCYTES NFR BLD AUTO: 10 % (ref 4–12)
NEUTS SEG # BLD: 6.6 K/UL (ref 1.7–8.2)
NEUTS SEG NFR BLD AUTO: 63 % (ref 43–78)
PLATELET # BLD AUTO: 314 K/UL (ref 150–450)
PLATELET COMMENTS,PCOM: ADEQUATE
PMV BLD AUTO: 10.8 FL (ref 10.8–14.1)
POTASSIUM SERPL-SCNC: 4.9 MMOL/L (ref 3.5–5.1)
RBC # BLD AUTO: 3.3 M/UL (ref 4.23–5.67)
RBC MORPH BLD: ABNORMAL
RETICS # AUTO: 0.14 M/UL (ref 0.03–0.1)
RETICS/RBC NFR AUTO: 4.1 % (ref 0.3–2)
SODIUM SERPL-SCNC: 138 MMOL/L (ref 136–145)
SPECIMEN EXP DATE BLD: NORMAL
STATUS OF UNIT,%ST: NORMAL
STATUS OF UNIT,%ST: NORMAL
UNIT DIVISION, %UDIV: 0
UNIT DIVISION, %UDIV: 0
WBC # BLD AUTO: 10.5 K/UL (ref 4.3–11.1)
WBC MORPH BLD: ABNORMAL

## 2017-03-01 PROCEDURE — 85018 HEMOGLOBIN: CPT | Performed by: INTERNAL MEDICINE

## 2017-03-01 PROCEDURE — 85652 RBC SED RATE AUTOMATED: CPT | Performed by: INTERNAL MEDICINE

## 2017-03-01 PROCEDURE — 74011250636 HC RX REV CODE- 250/636: Performed by: INTERNAL MEDICINE

## 2017-03-01 PROCEDURE — 85610 PROTHROMBIN TIME: CPT | Performed by: INTERNAL MEDICINE

## 2017-03-01 PROCEDURE — 36415 COLL VENOUS BLD VENIPUNCTURE: CPT | Performed by: INTERNAL MEDICINE

## 2017-03-01 PROCEDURE — 85025 COMPLETE CBC W/AUTO DIFF WBC: CPT | Performed by: INTERNAL MEDICINE

## 2017-03-01 PROCEDURE — 74011250637 HC RX REV CODE- 250/637: Performed by: INTERNAL MEDICINE

## 2017-03-01 PROCEDURE — 80048 BASIC METABOLIC PNL TOTAL CA: CPT | Performed by: INTERNAL MEDICINE

## 2017-03-01 PROCEDURE — 86147 CARDIOLIPIN ANTIBODY EA IG: CPT | Performed by: INTERNAL MEDICINE

## 2017-03-01 PROCEDURE — 86140 C-REACTIVE PROTEIN: CPT | Performed by: INTERNAL MEDICINE

## 2017-03-01 PROCEDURE — 65270000029 HC RM PRIVATE

## 2017-03-01 PROCEDURE — 86146 BETA-2 GLYCOPROTEIN ANTIBODY: CPT | Performed by: INTERNAL MEDICINE

## 2017-03-01 PROCEDURE — 85046 RETICYTE/HGB CONCENTRATE: CPT | Performed by: INTERNAL MEDICINE

## 2017-03-01 RX ORDER — DEXAMETHASONE SODIUM PHOSPHATE 4 MG/ML
4 INJECTION, SOLUTION INTRA-ARTICULAR; INTRALESIONAL; INTRAMUSCULAR; INTRAVENOUS; SOFT TISSUE ONCE
Status: COMPLETED | OUTPATIENT
Start: 2017-03-01 | End: 2017-03-01

## 2017-03-01 RX ORDER — DIPHENHYDRAMINE HYDROCHLORIDE 50 MG/ML
25 INJECTION, SOLUTION INTRAMUSCULAR; INTRAVENOUS ONCE
Status: DISCONTINUED | OUTPATIENT
Start: 2017-03-08 | End: 2017-03-04 | Stop reason: HOSPADM

## 2017-03-01 RX ORDER — DEXAMETHASONE SODIUM PHOSPHATE 4 MG/ML
4 INJECTION, SOLUTION INTRA-ARTICULAR; INTRALESIONAL; INTRAMUSCULAR; INTRAVENOUS; SOFT TISSUE ONCE
Status: DISCONTINUED | OUTPATIENT
Start: 2017-03-08 | End: 2017-03-04

## 2017-03-01 RX ORDER — DIPHENHYDRAMINE HCL 25 MG
25 CAPSULE ORAL
Status: DISCONTINUED | OUTPATIENT
Start: 2017-03-01 | End: 2017-03-04 | Stop reason: HOSPADM

## 2017-03-01 RX ORDER — DIPHENHYDRAMINE HYDROCHLORIDE 50 MG/ML
25 INJECTION, SOLUTION INTRAMUSCULAR; INTRAVENOUS ONCE
Status: COMPLETED | OUTPATIENT
Start: 2017-03-01 | End: 2017-03-01

## 2017-03-01 RX ADMIN — DIPHENHYDRAMINE HYDROCHLORIDE 25 MG: 50 INJECTION, SOLUTION INTRAMUSCULAR; INTRAVENOUS at 13:51

## 2017-03-01 RX ADMIN — TIZANIDINE 4 MG: 2 TABLET ORAL at 00:49

## 2017-03-01 RX ADMIN — TIZANIDINE 4 MG: 2 TABLET ORAL at 23:32

## 2017-03-01 RX ADMIN — PANTOPRAZOLE SODIUM 40 MG: 40 TABLET, DELAYED RELEASE ORAL at 06:30

## 2017-03-01 RX ADMIN — OXYCODONE HYDROCHLORIDE AND ACETAMINOPHEN 2 TABLET: 10; 325 TABLET ORAL at 17:49

## 2017-03-01 RX ADMIN — HYDROMORPHONE HYDROCHLORIDE 1 MG: 1 INJECTION, SOLUTION INTRAMUSCULAR; INTRAVENOUS; SUBCUTANEOUS at 00:49

## 2017-03-01 RX ADMIN — CALCIUM CARBONATE (ANTACID) CHEW TAB 500 MG 400 MG: 500 CHEW TAB at 21:37

## 2017-03-01 RX ADMIN — CALCIUM CARBONATE (ANTACID) CHEW TAB 500 MG 400 MG: 500 CHEW TAB at 12:57

## 2017-03-01 RX ADMIN — ONDANSETRON HYDROCHLORIDE 4 MG: 2 SOLUTION INTRAMUSCULAR; INTRAVENOUS at 19:45

## 2017-03-01 RX ADMIN — ONDANSETRON HYDROCHLORIDE 4 MG: 2 SOLUTION INTRAMUSCULAR; INTRAVENOUS at 23:32

## 2017-03-01 RX ADMIN — TIZANIDINE 4 MG: 2 TABLET ORAL at 05:19

## 2017-03-01 RX ADMIN — DEXAMETHASONE SODIUM PHOSPHATE 4 MG: 4 INJECTION, SOLUTION INTRAMUSCULAR; INTRAVENOUS at 13:51

## 2017-03-01 RX ADMIN — Medication 10 ML: at 06:00

## 2017-03-01 RX ADMIN — OXYCODONE HYDROCHLORIDE AND ACETAMINOPHEN 2 TABLET: 10; 325 TABLET ORAL at 21:26

## 2017-03-01 RX ADMIN — TIZANIDINE 4 MG: 2 TABLET ORAL at 12:50

## 2017-03-01 RX ADMIN — SODIUM CHLORIDE 125 ML/HR: 900 INJECTION, SOLUTION INTRAVENOUS at 00:53

## 2017-03-01 RX ADMIN — HYDROMORPHONE HYDROCHLORIDE 1 MG: 1 INJECTION, SOLUTION INTRAMUSCULAR; INTRAVENOUS; SUBCUTANEOUS at 05:19

## 2017-03-01 RX ADMIN — CALCIUM CARBONATE (ANTACID) CHEW TAB 500 MG 400 MG: 500 CHEW TAB at 05:30

## 2017-03-01 RX ADMIN — ONDANSETRON HYDROCHLORIDE 4 MG: 2 SOLUTION INTRAMUSCULAR; INTRAVENOUS at 12:47

## 2017-03-01 RX ADMIN — TIZANIDINE 4 MG: 2 TABLET ORAL at 17:49

## 2017-03-01 RX ADMIN — LEVOTHYROXINE SODIUM 200 MCG: 100 TABLET ORAL at 21:37

## 2017-03-01 RX ADMIN — CALCIUM CARBONATE (ANTACID) CHEW TAB 500 MG 400 MG: 500 CHEW TAB at 16:17

## 2017-03-01 RX ADMIN — HYDROMORPHONE HYDROCHLORIDE 1 MG: 1 INJECTION, SOLUTION INTRAMUSCULAR; INTRAVENOUS; SUBCUTANEOUS at 08:54

## 2017-03-01 RX ADMIN — ONDANSETRON HYDROCHLORIDE 4 MG: 2 SOLUTION INTRAMUSCULAR; INTRAVENOUS at 05:19

## 2017-03-01 RX ADMIN — FERRIC CARBOXYMALTOSE INJECTION 750 MG: 50 INJECTION, SOLUTION INTRAVENOUS at 14:30

## 2017-03-01 RX ADMIN — CALCIUM CARBONATE (ANTACID) CHEW TAB 500 MG 400 MG: 500 CHEW TAB at 08:55

## 2017-03-01 RX ADMIN — ONDANSETRON HYDROCHLORIDE 4 MG: 2 SOLUTION INTRAMUSCULAR; INTRAVENOUS at 16:17

## 2017-03-01 RX ADMIN — OXYCODONE HYDROCHLORIDE AND ACETAMINOPHEN 2 TABLET: 10; 325 TABLET ORAL at 10:42

## 2017-03-01 RX ADMIN — AMLODIPINE BESYLATE 10 MG: 10 TABLET ORAL at 21:37

## 2017-03-01 RX ADMIN — HYDROMORPHONE HYDROCHLORIDE 1 MG: 1 INJECTION, SOLUTION INTRAMUSCULAR; INTRAVENOUS; SUBCUTANEOUS at 16:16

## 2017-03-01 RX ADMIN — HYDROMORPHONE HYDROCHLORIDE 1 MG: 1 INJECTION, SOLUTION INTRAMUSCULAR; INTRAVENOUS; SUBCUTANEOUS at 12:51

## 2017-03-01 RX ADMIN — OXYCODONE HYDROCHLORIDE AND ACETAMINOPHEN 2 TABLET: 10; 325 TABLET ORAL at 13:51

## 2017-03-01 RX ADMIN — ONDANSETRON HYDROCHLORIDE 4 MG: 2 SOLUTION INTRAMUSCULAR; INTRAVENOUS at 08:55

## 2017-03-01 RX ADMIN — HYDROMORPHONE HYDROCHLORIDE 1 MG: 1 INJECTION, SOLUTION INTRAMUSCULAR; INTRAVENOUS; SUBCUTANEOUS at 19:45

## 2017-03-01 RX ADMIN — PROPRANOLOL HYDROCHLORIDE 120 MG: 60 CAPSULE, EXTENDED RELEASE ORAL at 21:38

## 2017-03-01 RX ADMIN — ONDANSETRON HYDROCHLORIDE 4 MG: 2 SOLUTION INTRAMUSCULAR; INTRAVENOUS at 00:49

## 2017-03-01 RX ADMIN — HYDROMORPHONE HYDROCHLORIDE 1 MG: 1 INJECTION, SOLUTION INTRAMUSCULAR; INTRAVENOUS; SUBCUTANEOUS at 23:32

## 2017-03-01 NOTE — PROGRESS NOTES
Given two tablets Percocet as requested by patient to keep pain more manageable. .. Ti Pickerel Ti Pickerel Ti Pickerel

## 2017-03-01 NOTE — CONSULTS
[address] Oncology Inpatient Consultation    Patient Name / SSN: Karin Payton / Yessenia Mattson requested by: Dr. Michele Ogden    Admission Date: 2/27/2017    Chief Complaint:  Severe anemia, bruising in thighs    Subjective:     he is a 51 y.o.  male presents with a Hb if 6.3 g/dl and bleeding in his inner thigs and upper calf and bruising / rash of the skin of the leg. He has been transfused 2 units PRBC and the Hb al to 10. 7 g/dl but is now 8.3 g/dl. He was seen by me on 7/9/14 for similar symptoms in [facility] and his  ferritin was low. Work up rule out Von Willebrand disease and PT and PTT were normal. He was given Ferrlecit infusions. I saw him in the office on 8/13/14 and his Hb was 14.4 g/dl and the ferritin 319 ng/ml. Since he felt well and had no complaints he has not seen me since then. He is not on anti platelet drugs or anticoagulation. There is no other history of bleeding. EGD and colonoscopy in the past were negative. H ehas no past history of bleeding prior to 2014 or any family history. His current iron studies show a low serum iron of 18, TIBC 305, sat 6 % and ferritin of 45 ng/mL. LDH is elevated at 394 which could be due to breakdown of the red cells in the hematoma. Admitting Diagnoses:  Principal Problem:    Symptomatic anemia (2/27/2017)    Active Problems:    Anemia (7/9/2014)      Overview:  With hemolysis, elevated bili, high LDH      Hypothyroidism (7/9/2014)      Pain in both lower extremities (7/9/2014)      Overview: left      Hypertension (7/17/2014)      Hyponatremia (7/19/2014)      Petechial rash (2/27/2017)      Bruising (2/27/2017)      Past Medical History:  Past Medical History:   Diagnosis Date    Cancer ([facility] [address]     stage 4 melanoma (REMOVED)    Endocrine disease     thyroid    Gastrointestinal disorder     ulcerative colitis, pyloric stenosis    Neurological disorder     migraines since age 10    Other ill-defined conditions(799.89)     anemia Past Surgical History:  Past Surgical History:   Procedure Laterality Date    HX ORTHOPAEDIC      left leg/ left hip    HX OTHER SURGICAL      thyroidectomy     Prior to Admission Medications:  Prior to Admission Medications   Prescriptions Last Dose Informant Patient Reported? Taking? RABEprazole (ACIPHEX) 20 mg tablet Unknown at Unknown time  Yes No   Sig: Take 20 mg by mouth daily. Indications: HEARTBURN   amLODIPine (NORVASC) 10 mg tablet 2/26/2017 at unknown  No No   Sig: Take 1 Tab by mouth daily. Patient taking differently: Take 10 mg by mouth nightly. Indications: hypertension   levothyroxine (SYNTHROID) 100 mcg tablet 2/27/2017 at unknown  Yes No   Sig: Take 200 mcg by mouth nightly. Indications: hypothyroidism   oxyCODONE-acetaminophen (PERCOCET)  mg per tablet 2/27/2017 at unknown  Yes Yes   Sig: Take 2 Tabs by mouth every four (4) hours as needed for Pain. promethazine (PHENERGAN) 25 mg tablet Unknown at Unknown time  Yes No   Sig: Take 25 mg by mouth every six (6) hours as needed for Nausea. propranolol LA (INDERAL LA) 120 mg SR capsule 2/26/2017 at [facility]  Yes Yes   Sig: Take 120 mg by mouth nightly. Indications: hypertension   temazepam (RESTORIL) 30 mg capsule   Yes No   Sig: Take 30 mg by mouth nightly. Indications: INSOMNIA   tiZANidine (ZANAFLEX) 4 mg tablet   Yes Yes   Sig: Take 4 mg by mouth every four (4) hours. Indications: MUSCLE SPASM      Facility-Administered Medications: None     Allergies: Allergies   Allergen Reactions    Amitriptyline Other (comments)     hallucinations    Reglan [Metoclopramide] Other (comments)     Problems breathing , ,jaw locks      Social History:  Social History   Substance Use Topics    Smoking status: Never Smoker    Smokeless tobacco: Not on file    Alcohol use No      Family History:  No family history on file.    Review of Systems  Constitutional: negative  Eyes: negative  Ears, nose, mouth, throat, and face: negative  Respiratory: negative  Cardiovascular: negative  Gastrointestinal: negative  Genitourinary:negative  Integument/breast: negative  Hematologic/lymphatic: positive for easy bruising, bleeding and swelling lower thigh  Musculoskeletal:positive for myalgias, arthralgias and pain thigh and back of calf  Neurological: negative  Behvioral/Psych: positive for anxiety  Endocrine: negative  Allergic/Immunologic: negative    Objective:   Vitals:  Patient Vitals for the past 8 hrs:   BP Temp Pulse Resp SpO2   17 0422 134/67 96.7 °F (35.9 °C) 99 18 98 %     Temp (24hrs), Av.5 °F (36.4 °C), Min:96.6 °F (35.9 °C), Max:99.2 °F (37.3 °C)      Intake / Output:    Intake/Output Summary (Last 24 hours) at 17 0810  Last data filed at 17 2210   Gross per 24 hour   Intake             1959 ml   Output              950 ml   Net             1009 ml       Physical Exam:  General appearance: alert, cooperative, severe distress, appears stated age, pale  Head: Normocephalic, without obvious abnormality, atraumatic  Eyes: negative  Ears: normal TM's and external ear canals AU  Nose: no discharge  Throat: Lips, mucosa, and tongue normal. Teeth and gums normal  Neck: supple, symmetrical, trachea midline, no adenopathy and thyroid: not enlarged, symmetric, no tenderness/mass/nodules  Back: negative  Lungs: clear to auscultation bilaterally  Breasts: Inspection negative  Heart: regular rate and rhythm, S1, S2 normal, no murmur, click, rub or gallop  Abdomen: soft, non-tender.  Bowel sounds normal. No masses,  no organomegaly  Pelvic: deferred  Extremities: edema mild and hematoma back of thighs and petichae and ecchymosis on the skin over the hematoma  Pulses: 2+ and symmetric  Skin: rash calf, ecchymosis  Lymph nodes: Cervical, supraclavicular, and axillary nodes normal.  CNS: No focal deficit    Labs:  Recent Results (from the past 24 hour(s))   HEMOGLOBIN    Collection Time: 17 11:01 AM   Result Value Ref Range    HGB 10.7 (L) 13.6 - 17.2 g/dL   LACTIC ACID, PLASMA    Collection Time: 02/28/17 11:01 AM   Result Value Ref Range    Lactic acid 1.6 0.4 - 2.0 MMOL/L   HEMOGLOBIN    Collection Time: 02/28/17  9:16 PM   Result Value Ref Range    HGB 9.0 (L) 13.6 - 17.2 g/dL   CBC WITH AUTOMATED DIFF    Collection Time: 03/01/17  4:35 AM   Result Value Ref Range    WBC 10.5 4.3 - 11.1 K/uL    RBC 3.30 (L) 4.23 - 5.67 M/uL    HGB 8.3 (L) 13.6 - 17.2 g/dL    HCT 26.1 (L) 41.1 - 50.3 %    MCV 79.1 (L) 79.6 - 97.8 FL    MCH 25.2 (L) 26.1 - 32.9 PG    MCHC 31.8 31.4 - 35.0 g/dL    RDW 16.6 (H) 11.9 - 14.6 %    PLATELET 828 555 - 358 K/uL    MPV 10.8 10.8 - 14.1 FL    NEUTROPHILS 63 43 - 78 %    LYMPHOCYTES 26 13 - 44 %    MONOCYTES 10 4.0 - 12.0 %    EOSINOPHILS 0 (L) 0.5 - 7.8 %    BASOPHILS 1 0.0 - 2.0 %    ABS. NEUTROPHILS 6.6 1.7 - 8.2 K/UL    ABS. LYMPHOCYTES 2.7 0.5 - 4.6 K/UL    ABS. MONOCYTES 1.1 0.1 - 1.3 K/UL    ABS. EOSINOPHILS 0.0 0.0 - 0.8 K/UL    ABS. BASOPHILS 0.1 0.0 - 0.2 K/UL    RBC COMMENTS NORMOCYTIC/NORMOCHROMIC      WBC COMMENTS Result Confirmed By Smear      PLATELET COMMENTS ADEQUATE      DF MANUAL     METABOLIC PANEL, BASIC    Collection Time: 03/01/17  4:35 AM   Result Value Ref Range    Sodium 138 136 - 145 mmol/L    Potassium 4.9 3.5 - 5.1 mmol/L    Chloride 102 98 - 107 mmol/L    CO2 20 (L) 21 - 32 mmol/L    Anion gap 16 7 - 16 mmol/L    Glucose 112 (H) 65 - 100 mg/dL    BUN 17 6 - 23 MG/DL    Creatinine 0.99 0.8 - 1.5 MG/DL    GFR est AA >60 >60 ml/min/1.73m2    GFR est non-AA >60 >60 ml/min/1.73m2    Calcium 9.1 8.3 - 10.4 MG/DL       Assessment:     Principal Problem:    Symptomatic anemia (2/27/2017)    Active Problems:    Anemia (7/9/2014)      Overview:  With hemolysis, elevated bili, high LDH      Hypothyroidism (7/9/2014)      Pain in both lower extremities (7/9/2014)      Overview: left      Hypertension (7/17/2014)      Hyponatremia (7/19/2014)      Petechial rash (2/27/2017)      Bruising (2/27/2017)        Plan: 1. Bleeding of uncertain etiology with normal PT and PTT and negative Janann Claudio work up in 2014.   2. Will work up for phospholipid syndrome which rarely will cause bleeding  3. 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 But in those cases that occur predominantly in women the blood work is normal and there is no major hematomas and such severe anemia. 4. Will treat him with Injectafer 750 mg IV q weekly x 2 to correct the iron deficiency.  Hopefully he will respond as well as the last visit of 2014

## 2017-03-01 NOTE — PROGRESS NOTES
Hospitalist Progress Note    3/1/2017  Admit Date: 2017  9:22 PM   NAME: Koffi Vidal   :  1970   MRN:  [id number]   Attending: Rupinder Warren MD  PCP:  PROVIDER UNKNOWN  Treatment Team: Attending Provider: Rupinder Warren MD; Consulting Provider: Ezequiel Corey MD; Utilization Review: Tonia Chambers RN    Full code  SUBJECTIVE:   Mr. Sury Cam is a 52 yo male who presented with c/o bilateral LE aching, back pain and HA (reports HA are chronic). Also c/o thigh bruising. Reports rash started on 's Day and has progressively worsened. He was found to be anemia pablo hgb 6.3, WBC 16.5, Platelets 124. Reports similar episode in  and required multiple units of PRBC. He reports being seen by Hematology, Rheumatology, Dermatology with no final diagnosis. Does have chronic pain but reports significant increase in pain with this flaring of his BLE rash and bruising. Reports better pain control overnight and was able to get a little bit of sleep. Was also able to tolerate a small amount of po this morning. Denies CP, SOB, N/V, diarrhea.       Past Medical History:   Diagnosis Date    Cancer [facility])     stage 4 melanoma (REMOVED)    Endocrine disease     thyroid    Gastrointestinal disorder     ulcerative colitis, pyloric stenosis    Neurological disorder     migraines since age 10    Other ill-defined conditions(799.89)     anemia     Recent Results (from the past 24 hour(s))   HEMOGLOBIN    Collection Time: 17 11:01 AM   Result Value Ref Range    HGB 10.7 (L) 13.6 - 17.2 g/dL   LACTIC ACID, PLASMA    Collection Time: 17 11:01 AM   Result Value Ref Range    Lactic acid 1.6 0.4 - 2.0 MMOL/L   HEMOGLOBIN    Collection Time: 17  9:16 PM   Result Value Ref Range    HGB 9.0 (L) 13.6 - 17.2 g/dL   CBC WITH AUTOMATED DIFF    Collection Time: 17  4:35 AM   Result Value Ref Range    WBC 10.5 4.3 - 11.1 K/uL    RBC 3.30 (L) 4.23 - 5.67 M/uL    HGB 8.3 (L) 13.6 - 17.2 g/dL    HCT 26.1 (L) 41.1 - 50.3 %    MCV 79.1 (L) 79.6 - 97.8 FL    MCH 25.2 (L) 26.1 - 32.9 PG    MCHC 31.8 31.4 - 35.0 g/dL    RDW 16.6 (H) 11.9 - 14.6 %    PLATELET 399 926 - 963 K/uL    MPV 10.8 10.8 - 14.1 FL    NEUTROPHILS 63 43 - 78 %    LYMPHOCYTES 26 13 - 44 %    MONOCYTES 10 4.0 - 12.0 %    EOSINOPHILS 0 (L) 0.5 - 7.8 %    BASOPHILS 1 0.0 - 2.0 %    ABS. NEUTROPHILS 6.6 1.7 - 8.2 K/UL    ABS. LYMPHOCYTES 2.7 0.5 - 4.6 K/UL    ABS. MONOCYTES 1.1 0.1 - 1.3 K/UL    ABS. EOSINOPHILS 0.0 0.0 - 0.8 K/UL    ABS. BASOPHILS 0.1 0.0 - 0.2 K/UL    RBC COMMENTS NORMOCYTIC/NORMOCHROMIC      WBC COMMENTS Result Confirmed By Smear      PLATELET COMMENTS ADEQUATE      DF MANUAL     METABOLIC PANEL, BASIC    Collection Time: 03/01/17  4:35 AM   Result Value Ref Range    Sodium 138 136 - 145 mmol/L    Potassium 4.9 3.5 - 5.1 mmol/L    Chloride 102 98 - 107 mmol/L    CO2 20 (L) 21 - 32 mmol/L    Anion gap 16 7 - 16 mmol/L    Glucose 112 (H) 65 - 100 mg/dL    BUN 17 6 - 23 MG/DL    Creatinine 0.99 0.8 - 1.5 MG/DL    GFR est AA >60 >60 ml/min/1.73m2    GFR est non-AA >60 >60 ml/min/1.73m2    Calcium 9.1 8.3 - 10.4 MG/DL     Allergies   Allergen Reactions    Amitriptyline Other (comments)     hallucinations    Reglan [Metoclopramide] Other (comments)     Problems breathing , ,jaw locks     Current Facility-Administered Medications   Medication Dose Route Frequency Provider Last Rate Last Dose    amLODIPine (NORVASC) tablet 10 mg  10 mg Oral QHS Mooney Oppenheim. Morena Pickens MD   10 mg at 02/28/17 2110    levothyroxine (SYNTHROID) tablet 200 mcg  200 mcg Oral QHS Mooney Oppenheim. Morena Pickens MD   200 mcg at 02/28/17 2109    oxyCODONE-acetaminophen (PERCOCET 10)  mg per tablet 2 Tab  2 Tab Oral Q4H PRN Mooney Oppenheim. Morena Pickens MD   2 Tab at 02/28/17 2256    promethazine (PHENERGAN) tablet 25 mg  25 mg Oral Q6H PRN Mooney Oppenheim. Morena Pickens MD   25 mg at 02/28/17 1441    propranolol LA (INDERAL LA) capsule 120 mg  120 mg Oral QHS Mooney Oppenheim.  Morena Pickens MD   120 mg at 17    pantoprazole (PROTONIX) tablet 40 mg  40 mg Oral ACB  . Kendra Warren MD   40 mg at 17 06    temazepam (RESTORIL) capsule 30 mg  30 mg Oral QHS  . Kendra Warren MD   30 mg at 17    tiZANidine (ZANAFLEX) tablet 4 mg  4 mg Oral Q6H Esdras Warren MD   4 mg at 17 0519    0.9% sodium chloride infusion  125 mL/hr IntraVENous CONTINUOUS Rupinder Se. Kendra Warren  mL/hr at 17 0053 125 mL/hr at 17 0053    ondansetron (ZOFRAN) injection 4 mg  4 mg IntraVENous Q4H PRN Rupinder . Kendra Warren MD   4 mg at 17 0855    calcium carbonate (TUMS) chewable tablet 400 mg [elemental]  400 mg Oral QID WITH MEALS . Kendra Warren MD   400 mg at 17 0855    HYDROmorphone (PF) (DILAUDID) injection 1 mg  1 mg IntraVENous Q4H PRN eLsley Patrick MD   1 mg at 17 0854    sodium chloride (NS) flush 5-10 mL  5-10 mL IntraVENous Q8H Charla Lerma III, MD   10 mL at 17 0600    sodium chloride (NS) flush 5-10 mL  5-10 mL IntraVENous PRN Charla Lerma III, MD        0.9% sodium chloride infusion 250 mL  250 mL IntraVENous PRN Prosper Engel MD           Review of Systems negative with exception of pertinent positives noted above  PHYSICAL EXAM     Visit Vitals    /81    Pulse 73    Temp 96.7 °F (35.9 °C)    Resp 20    Ht 5' 7\" (1.702 m)    Wt 95.3 kg (210 lb)    SpO2 99%    BMI 32.89 kg/m2      Temp (24hrs), Av.5 °F (36.4 °C), Min:96.7 °F (35.9 °C), Max:99.2 °F (37.3 °C)    Oxygen Therapy  O2 Sat (%): 99 % (17 0813)  Pulse via Oximetry: 95 beats per minute (17 0020)  O2 Device: Room air (17 2013)    Intake/Output Summary (Last 24 hours) at 17 1039  Last data filed at 17 2210   Gross per 24 hour   Intake             1634 ml   Output              950 ml   Net              684 ml      General: No acute distress    Lungs: CTA bilaterally.  Resp even and nonlabored  Heart:  RRR, swelling of LE around areas of rash  Abdomen: Soft, NTTP. Extremities: Pain with movement of legs  Neurologic:  No focal deficits  Psych: A/O X3  Skin:   Petechiae on BLE with some mild gordon crusting. Large bruises in B upper thighs and behind L knee      Results summary of Diagnostic Studies/Procedures copied from within [facility] EMR:         Jere Aguilar 96 Problems    Diagnosis Date Noted    Symptomatic anemia 02/27/2017    Petechial rash 02/27/2017    Bruising 02/27/2017    Hyponatremia 07/19/2014    Hypertension 07/17/2014    Hypothyroidism 07/09/2014    Pain in both lower extremities 07/09/2014     left      Anemia 07/09/2014     With hemolysis, elevated bili, high LDH       Plan:    Symptomatic anemia: pt received 2 units PRBCs, Hgb corrected to 10.7 Has since trended down to 8.3. Iron infusions added per Heme recs. Petechial rash/bruising: Hematology consulted. Labs ordered to w/u anti-phospholipid syndrome. ESR and CRP added as well. May need Dermatology or Rheum for guidance in the future. If has not been biopsied previously, would benefit from that. Will stop rocephin and monitor. HTN: chronic, elevated. Norvasc started.  Hydralazine prn      DVT Prophylaxis: contraindicated (no anticoagulation due to anemia, no SCDs due to unknown etiology of LE bruising)  Dispo: home when pain improved and anemia stabilized    Nel Anne MD

## 2017-03-01 NOTE — CONSULTS
Pain Medicine Consult Note    Patient: Micah Lama MRN: [id number]  SSN: [id number]    YOB: 1970  Age: 51 y.o. Sex: male      Assessment:     The patient was seen in consultation at the request of Valencia Barba MD regarding assistance with pain management. The patient is obtaining adequate relief on the current regimen. He states that his current regimen is allowing to be fairly comfortable, as long as he stays on top of it. We discussed the need to be off IV medications prior to discharge, but as workup is ongoing and there does not appear to be an imminent plan for discharge, we agreed to recommend that he continue his current regimen for now. He is going to try to space out the interval between IV medication doses and uses lower doses of both IV and PO opioids if possible. It would also be beneficial to maximize his non-opioid analgesics in an effort to wean down on his opioids. His current morphine milligram equivalents are 315/day, compared with his home regimen of 90/day. This is a significant increase and I would not recommend increasing further. If his pain is poorly controlled on this dose, it is likely that a significant portion of his pain is not opioid responsive. Recommendations:     -Continue hydromorphone IV 0.5-1 mg q4hrs PRN  -Discontinue Percocet   -Start oxycodone 10-20 mg q4hrs PRN  -Start acetaminophen 650 mq schedule q4hrs while awake, if appropriate from a medical standpoint  -Start ibuprofen 600 mg scheduled q8hrs, if appropriate from a medical standpoint  -Start gabapentin 300 mg scheduled q8hrs  -Will plan to convert IV hydromorphone to PO regimen as workup progresses and patient is closer to discharge      Subjective:      Micah Lama is a 51 y.o. male seen in consultation at the request of Valencia Barba MD for evaluation of acute bilateral lower extremity pain in the context of chronic opioid management.   The pain started around the middle of 2/2017 without inciting event and has been worsening. The patient describes his pain as aching. He rates his pain level as 8/10, but states it is currently well controlled. The pain occurs continuously . Associated symptoms include swelling, bruising, and a maculopapular rash of the bilateral lower extremities. He is also being worked up for symptomatic anemia requiring transfusion of unknown etiology. For the pain, the patient is currently on:    Scheduled:  -tizanidine 4 mg q6hrs for spasms    PRN:  -hydromorphone IV 1 mg q4hrs (getting q4hrs)  -Percocet 10/325 mg 2 tabs q4hrs (getting q4hrs)    Prior to admission, the patient was on home opiate medications. He has been followed by a pain clinic. He is a patient of Dr. Rita To and has been treated for multiple chronic pains including back, hip, knee, abdominal, leg, and headache pain. He was prescribed a regimen of MS Contin 100 mg BID and Percocet 10/325 mg two tabs TID PRN, however, he had only been taking the Percocet for the past 2-3 months for a morphine milligram equivalent of 90/day. Past Medical History:   Diagnosis Date    Cancer [facility])     stage 4 melanoma (REMOVED)    Endocrine disease     thyroid    Gastrointestinal disorder     ulcerative colitis, pyloric stenosis    Neurological disorder     migraines since age 10    Other ill-defined conditions(799.89)     anemia     Past Surgical History:   Procedure Laterality Date    HX ORTHOPAEDIC      left leg/ left hip    HX OTHER SURGICAL      thyroidectomy      No family history on file.   Social History   Substance Use Topics    Smoking status: Never Smoker    Smokeless tobacco: Not on file    Alcohol use No      Current Facility-Administered Medications   Medication Dose Route Frequency Provider Last Rate Last Dose    [START ON 3/8/2017] ferric carboxymaltose (INJECTAFER) 750 mg in 0.9% sodium chloride 250 mL IVPB  750 mg IntraVENous Priscilla Murray MD       [facility] Alverna Petit ON 3/8/2017] diphenhydrAMINE (BENADRYL) injection 25 mg  25 mg IntraVENous ONCE Jimmy Roberts MD        [START ON 3/8/2017] dexamethasone (DECADRON) 4 mg/mL injection 4 mg  4 mg IntraVENous ONCE Christy Kidd MD        amLODIPine (NORVASC) tablet 10 mg  10 mg Oral QHS Sampson Pilon. Aniket Gannon MD   10 mg at 02/28/17 2110    levothyroxine (SYNTHROID) tablet 200 mcg  200 mcg Oral QHS Sampson Pilon. Aniket Gannon MD   200 mcg at 02/28/17 2109    oxyCODONE-acetaminophen (PERCOCET 10)  mg per tablet 2 Tab  2 Tab Oral Q4H PRN Sampson Pilon. Aniket Gannon MD   2 Tab at 03/01/17 1749    promethazine (PHENERGAN) tablet 25 mg  25 mg Oral Q6H PRN Sampson Pilon. Aniket Gannon MD   25 mg at 02/28/17 1441    propranolol LA (INDERAL LA) capsule 120 mg  120 mg Oral QHS Sampson Pilon. Aniket Gannon MD   120 mg at 02/28/17 2109    pantoprazole (PROTONIX) tablet 40 mg  40 mg Oral ACB Sampson Pilon. Aniket Gannon MD   40 mg at 03/01/17 0630    temazepam (RESTORIL) capsule 30 mg  30 mg Oral QHS Sampson Pilon. Aniket Gannon MD   30 mg at 02/28/17 2109    tiZANidine (ZANAFLEX) tablet 4 mg  4 mg Oral Q6H Esdras Gannon MD   4 mg at 03/01/17 1749    0.9% sodium chloride infusion  75 mL/hr IntraVENous CONTINUOUS Monique Julian MD 75 mL/hr at 03/01/17 1051 75 mL/hr at 03/01/17 1051    ondansetron (ZOFRAN) injection 4 mg  4 mg IntraVENous Q4H PRN Sampson Pilon. Aniket Gannon MD   4 mg at 03/01/17 1617    calcium carbonate (TUMS) chewable tablet 400 mg [elemental]  400 mg Oral QID WITH MEALS Sampson Pilon.  Aniket Gannon MD   400 mg at 03/01/17 1617    HYDROmorphone (PF) (DILAUDID) injection 1 mg  1 mg IntraVENous Q4H PRN Opal Lehman MD   1 mg at 03/01/17 1616    sodium chloride (NS) flush 5-10 mL  5-10 mL IntraVENous Q8H Mera Sheppard III, MD   10 mL at 03/01/17 0600    sodium chloride (NS) flush 5-10 mL  5-10 mL IntraVENous PRN Jania Romo III, MD        0.9% sodium chloride infusion 250 mL  250 mL IntraVENous PRN Tiki Bridges MD            Allergies   Allergen Reactions    Amitriptyline Other (comments)     hallucinations    Reglan [Metoclopramide] Other (comments)     Problems breathing , ,jaw locks       Review of Systems:  Pertinent items are noted in the History of Present Illness. Objective:     Vitals:    03/01/17 0422 03/01/17 0813 03/01/17 1138 03/01/17 1527   BP: 134/67 114/81 117/82 121/85   Pulse: 99 73 77 76   Resp: 18 20 20 18   Temp: 96.7 °F (35.9 °C) 96.7 °F (35.9 °C) 98 °F (36.7 °C) 98.4 °F (36.9 °C)   SpO2: 98% 99% 98% 95%   Weight:       Height:            Physical Exam:  GENERAL: alert, cooperative, no distress, appears stated age  THROAT & NECK: normal and no erythema or exudates noted. LUNG: clear to auscultation bilaterally  HEART: regular rate and rhythm  ABDOMEN: soft, non-tender. Bowel sounds normal. No masses,  no organomegaly  EXTREMITIES:  Edema of BLE  SKIN: visible and palpable rash, and bruising noted in BLE  NEUROLOGIC: negative  PSYCHIATRIC: appropriate mood and affect    Labs:  Recent Results (from the past 24 hour(s))   HEMOGLOBIN    Collection Time: 02/28/17  9:16 PM   Result Value Ref Range    HGB 9.0 (L) 13.6 - 17.2 g/dL   CBC WITH AUTOMATED DIFF    Collection Time: 03/01/17  4:35 AM   Result Value Ref Range    WBC 10.5 4.3 - 11.1 K/uL    RBC 3.30 (L) 4.23 - 5.67 M/uL    HGB 8.3 (L) 13.6 - 17.2 g/dL    HCT 26.1 (L) 41.1 - 50.3 %    MCV 79.1 (L) 79.6 - 97.8 FL    MCH 25.2 (L) 26.1 - 32.9 PG    MCHC 31.8 31.4 - 35.0 g/dL    RDW 16.6 (H) 11.9 - 14.6 %    PLATELET 752 019 - 609 K/uL    MPV 10.8 10.8 - 14.1 FL    NEUTROPHILS 63 43 - 78 %    LYMPHOCYTES 26 13 - 44 %    MONOCYTES 10 4.0 - 12.0 %    EOSINOPHILS 0 (L) 0.5 - 7.8 %    BASOPHILS 1 0.0 - 2.0 %    ABS. NEUTROPHILS 6.6 1.7 - 8.2 K/UL    ABS. LYMPHOCYTES 2.7 0.5 - 4.6 K/UL    ABS. MONOCYTES 1.1 0.1 - 1.3 K/UL    ABS. EOSINOPHILS 0.0 0.0 - 0.8 K/UL    ABS.  BASOPHILS 0.1 0.0 - 0.2 K/UL    RBC COMMENTS NORMOCYTIC/NORMOCHROMIC      WBC COMMENTS Result Confirmed By Smear      PLATELET COMMENTS ADEQUATE DF MANUAL     METABOLIC PANEL, BASIC    Collection Time: 03/01/17  4:35 AM   Result Value Ref Range    Sodium 138 136 - 145 mmol/L    Potassium 4.9 3.5 - 5.1 mmol/L    Chloride 102 98 - 107 mmol/L    CO2 20 (L) 21 - 32 mmol/L    Anion gap 16 7 - 16 mmol/L    Glucose 112 (H) 65 - 100 mg/dL    BUN 17 6 - 23 MG/DL    Creatinine 0.99 0.8 - 1.5 MG/DL    GFR est AA >60 >60 ml/min/1.73m2    GFR est non-AA >60 >60 ml/min/1.73m2    Calcium 9.1 8.3 - 10.4 MG/DL   HEMOGLOBIN    Collection Time: 03/01/17 10:41 AM   Result Value Ref Range    HGB 8.6 (L) 13.6 - 17.2 g/dL   SED RATE, AUTOMATED    Collection Time: 03/01/17 10:41 AM   Result Value Ref Range    Sed rate, automated 96 (H) 0 - 20 mm/hr   C REACTIVE PROTEIN, QT    Collection Time: 03/01/17 10:41 AM   Result Value Ref Range    C-Reactive protein 7.7 (H) 0.0 - 0.9 mg/dL   RETICULOCYTE COUNT    Collection Time: 03/01/17 10:41 AM   Result Value Ref Range    Reticulocyte count 4.1 (H) 0.3 - 2.0 %    Absolute Retic Cnt. 0.1402 (H) 0.026 - 0.095 M/ul    Immature Retic Fraction 27.0 (H) 2.3 - 13.4 %    Retic Hgb Conc. 16 (L) 29 - 35 pg       Hospital Problems  Never Reviewed          Codes Class Noted POA    * (Principal)Symptomatic anemia ICD-10-CM: D64.9  ICD-9-CM: 285.9  2/27/2017 Yes        Petechial rash ICD-10-CM: R23.3  ICD-9-CM: 782.7  2/27/2017 Yes        Bruising ICD-10-CM: T14.8  ICD-9-CM: 924.9  2/27/2017 Yes        Hyponatremia ICD-10-CM: E87.1  ICD-9-CM: 276.1  7/19/2014 Yes        Hypertension ICD-10-CM: I10  ICD-9-CM: 401.9  7/17/2014 Yes        Anemia ICD-10-CM: D64.9  ICD-9-CM: 285.9  7/9/2014 Yes    Overview Signed 7/15/2014  7:01 PM by Oumou Barbour     With hemolysis, elevated bili, high LDH             Hypothyroidism ICD-10-CM: E03.9  ICD-9-CM: 244.9  7/9/2014 Yes        Pain in both lower extremities ICD-10-CM: M79.604, M79.605  ICD-9-CM: 729.5  7/9/2014 Yes    Overview Signed 7/9/2014 11:09 PM by Arita Councilman     left                   Signed By: Larisa Esquivel MD     March 1, 2017

## 2017-03-01 NOTE — PROGRESS NOTES
Modestomartha Pichardo position,,appears to be in no distress,,,,IV access patent on left arm with fluids infusing,,complaints of pain on bilateral LE's,,both LE's propped up on pillow,,noted rashes all over his LE's,,dry and intact,,blood drawn for Hgb.level,,stated pain level=9-10/10,,took all p.o.medications well,,given slow IV push Dilaudid (1mg.),prn for pain,,also given slow IV push Zofran (4mgs.) for complaints of nausea,,immediate needs attended to by his wife. .. Beth Justice

## 2017-03-02 LAB
ANION GAP BLD CALC-SCNC: 10 MMOL/L (ref 7–16)
BUN SERPL-MCNC: 13 MG/DL (ref 6–23)
CALCIUM SERPL-MCNC: 8.8 MG/DL (ref 8.3–10.4)
CHLORIDE SERPL-SCNC: 97 MMOL/L (ref 98–107)
CO2 SERPL-SCNC: 23 MMOL/L (ref 21–32)
CREAT SERPL-MCNC: 0.84 MG/DL (ref 0.8–1.5)
DIFFERENTIAL METHOD BLD: ABNORMAL
ERYTHROCYTE [DISTWIDTH] IN BLOOD BY AUTOMATED COUNT: 17.3 % (ref 11.9–14.6)
GLUCOSE SERPL-MCNC: 116 MG/DL (ref 65–100)
HCT VFR BLD AUTO: 28.1 % (ref 41.1–50.3)
HGB BLD-MCNC: 8.6 G/DL (ref 13.6–17.2)
LYMPHOCYTES # BLD AUTO: 15 %
LYMPHOCYTES # BLD: 1.5 K/UL (ref 0.5–4.6)
MCH RBC QN AUTO: 25.1 PG (ref 26.1–32.9)
MCHC RBC AUTO-ENTMCNC: 30.6 G/DL (ref 31.4–35)
MCV RBC AUTO: 82.2 FL (ref 79.6–97.8)
MONOCYTES # BLD: 0.2 K/UL (ref 0.1–1.3)
MONOCYTES NFR BLD AUTO: 2 %
NEUTS SEG # BLD: 8 K/UL (ref 1.7–8.2)
NEUTS SEG NFR BLD AUTO: 83 %
NRBC BLD-RTO: 6 PER 100 WBC
PLATELET # BLD AUTO: 378 K/UL (ref 150–450)
PLATELET COMMENTS,PCOM: ADEQUATE
PMV BLD AUTO: 9.6 FL (ref 10.8–14.1)
POTASSIUM SERPL-SCNC: 4.2 MMOL/L (ref 3.5–5.1)
RBC # BLD AUTO: 3.42 M/UL (ref 4.23–5.67)
RBC MORPH BLD: ABNORMAL
RBC MORPH BLD: ABNORMAL
SODIUM SERPL-SCNC: 130 MMOL/L (ref 136–145)
WBC # BLD AUTO: 9.7 K/UL (ref 4.3–11.1)

## 2017-03-02 PROCEDURE — 74011250636 HC RX REV CODE- 250/636: Performed by: INTERNAL MEDICINE

## 2017-03-02 PROCEDURE — 65270000029 HC RM PRIVATE

## 2017-03-02 PROCEDURE — 85025 COMPLETE CBC W/AUTO DIFF WBC: CPT | Performed by: INTERNAL MEDICINE

## 2017-03-02 PROCEDURE — 80048 BASIC METABOLIC PNL TOTAL CA: CPT | Performed by: INTERNAL MEDICINE

## 2017-03-02 PROCEDURE — 77030027138 HC INCENT SPIROMETER -A

## 2017-03-02 PROCEDURE — 36415 COLL VENOUS BLD VENIPUNCTURE: CPT | Performed by: INTERNAL MEDICINE

## 2017-03-02 PROCEDURE — 74011250637 HC RX REV CODE- 250/637: Performed by: INTERNAL MEDICINE

## 2017-03-02 RX ADMIN — SODIUM CHLORIDE 75 ML/HR: 900 INJECTION, SOLUTION INTRAVENOUS at 12:27

## 2017-03-02 RX ADMIN — OXYCODONE HYDROCHLORIDE AND ACETAMINOPHEN 2 TABLET: 10; 325 TABLET ORAL at 12:24

## 2017-03-02 RX ADMIN — HYDROMORPHONE HYDROCHLORIDE 1 MG: 1 INJECTION, SOLUTION INTRAMUSCULAR; INTRAVENOUS; SUBCUTANEOUS at 18:48

## 2017-03-02 RX ADMIN — TEMAZEPAM 30 MG: 15 CAPSULE ORAL at 00:26

## 2017-03-02 RX ADMIN — ONDANSETRON HYDROCHLORIDE 4 MG: 2 SOLUTION INTRAMUSCULAR; INTRAVENOUS at 06:13

## 2017-03-02 RX ADMIN — DIPHENHYDRAMINE HYDROCHLORIDE 25 MG: 25 CAPSULE ORAL at 14:28

## 2017-03-02 RX ADMIN — HYDROMORPHONE HYDROCHLORIDE 1 MG: 1 INJECTION, SOLUTION INTRAMUSCULAR; INTRAVENOUS; SUBCUTANEOUS at 22:14

## 2017-03-02 RX ADMIN — ONDANSETRON HYDROCHLORIDE 4 MG: 2 SOLUTION INTRAMUSCULAR; INTRAVENOUS at 22:14

## 2017-03-02 RX ADMIN — HYDROMORPHONE HYDROCHLORIDE 1 MG: 1 INJECTION, SOLUTION INTRAMUSCULAR; INTRAVENOUS; SUBCUTANEOUS at 10:14

## 2017-03-02 RX ADMIN — PROPRANOLOL HYDROCHLORIDE 120 MG: 60 CAPSULE, EXTENDED RELEASE ORAL at 21:03

## 2017-03-02 RX ADMIN — ONDANSETRON HYDROCHLORIDE 4 MG: 2 SOLUTION INTRAMUSCULAR; INTRAVENOUS at 10:14

## 2017-03-02 RX ADMIN — AMLODIPINE BESYLATE 10 MG: 10 TABLET ORAL at 21:01

## 2017-03-02 RX ADMIN — OXYCODONE HYDROCHLORIDE AND ACETAMINOPHEN 2 TABLET: 10; 325 TABLET ORAL at 08:35

## 2017-03-02 RX ADMIN — PROMETHAZINE HYDROCHLORIDE 25 MG: 25 TABLET ORAL at 18:48

## 2017-03-02 RX ADMIN — ONDANSETRON HYDROCHLORIDE 4 MG: 2 SOLUTION INTRAMUSCULAR; INTRAVENOUS at 03:31

## 2017-03-02 RX ADMIN — OXYCODONE HYDROCHLORIDE AND ACETAMINOPHEN 2 TABLET: 10; 325 TABLET ORAL at 04:52

## 2017-03-02 RX ADMIN — OXYCODONE HYDROCHLORIDE AND ACETAMINOPHEN 2 TABLET: 10; 325 TABLET ORAL at 16:40

## 2017-03-02 RX ADMIN — TIZANIDINE 4 MG: 2 TABLET ORAL at 16:40

## 2017-03-02 RX ADMIN — PANTOPRAZOLE SODIUM 40 MG: 40 TABLET, DELAYED RELEASE ORAL at 06:14

## 2017-03-02 RX ADMIN — PROMETHAZINE HYDROCHLORIDE 25 MG: 25 TABLET ORAL at 08:41

## 2017-03-02 RX ADMIN — HYDROMORPHONE HYDROCHLORIDE 1 MG: 1 INJECTION, SOLUTION INTRAMUSCULAR; INTRAVENOUS; SUBCUTANEOUS at 14:26

## 2017-03-02 RX ADMIN — TIZANIDINE 4 MG: 2 TABLET ORAL at 12:24

## 2017-03-02 RX ADMIN — LEVOTHYROXINE SODIUM 200 MCG: 100 TABLET ORAL at 21:04

## 2017-03-02 RX ADMIN — HYDROMORPHONE HYDROCHLORIDE 1 MG: 1 INJECTION, SOLUTION INTRAMUSCULAR; INTRAVENOUS; SUBCUTANEOUS at 03:31

## 2017-03-02 RX ADMIN — OXYCODONE HYDROCHLORIDE AND ACETAMINOPHEN 2 TABLET: 10; 325 TABLET ORAL at 01:03

## 2017-03-02 RX ADMIN — TIZANIDINE 4 MG: 2 TABLET ORAL at 06:14

## 2017-03-02 RX ADMIN — HYDROMORPHONE HYDROCHLORIDE 1 MG: 1 INJECTION, SOLUTION INTRAMUSCULAR; INTRAVENOUS; SUBCUTANEOUS at 06:14

## 2017-03-02 RX ADMIN — OXYCODONE HYDROCHLORIDE AND ACETAMINOPHEN 2 TABLET: 10; 325 TABLET ORAL at 20:59

## 2017-03-02 NOTE — PROGRESS NOTES
Hospitalist Progress Note    3/2/2017  Admit Date: 2017  9:22 PM   NAME: Lorna Rizvi   :  1970   MRN:  [id number]   Attending: Samina Appiah. Wali Fragoso MD  PCP:  PROVIDER UNKNOWN    SUBJECTIVE:   Mr. Lorin Zhong is a 50 yo male who presented with c/o bilateral LE aching, back pain and HA (reports HA are chronic). Also c/o thigh bruising. Reports rash started on 's Day and has progressively worsened. He was found to be anemia pablo hgb 6.3, WBC 16.5, Platelets 792. Reports similar episode in  and required multiple units of PRBC. He reports being seen by Hematology, Rheumatology, Dermatology with no final diagnosis. Does have chronic pain but reports significant increase in pain with this flaring of his BLE rash and bruising. Reports better pain control overnight and was able to get a little bit of sleep. 3/2/2017: Pt. Seen by hematologist and work-up is continued. He states he has pain to right inner thigh with area of swelling present. He states he was up x 1 to bathroom but had severe pain in both legs with elevation in BP due to pain. He states he still feels somewhat weak but improved overall from initially. Denies chills, fever, nausea, vomiting, chest pain or SOB. Tolerating Fe+ infusion well.       Review of Systems negative with exception of pertinent positives noted above  PHYSICAL EXAM     Visit Vitals    BP (!) 145/94    Pulse 93    Temp 97.8 °F (36.6 °C)    Resp 18    Ht 5' 7\" (1.702 m)    Wt 95.3 kg (210 lb)    SpO2 91%    BMI 32.89 kg/m2      Temp (24hrs), Av.5 °F (36.4 °C), Min:96.5 °F (35.8 °C), Max:98.4 °F (36.9 °C)    Oxygen Therapy  O2 Sat (%): 91 % (17 0755)  Pulse via Oximetry: 95 beats per minute (17 0020)  O2 Device: Room air (17)    Intake/Output Summary (Last 24 hours) at 17 1009  Last data filed at 17   Gross per 24 hour   Intake              480 ml   Output                0 ml   Net              480 ml General: Sitting upright in bed with wife at bedside in NAD. Lungs:  CTA Bilaterally. Heart:  Regular rate and rhythm,  No murmur, rub, or gallop  Abdomen: Soft, Non distended, Non tender, Positive bowel sounds  Extremities: Petechial lesions with larger areas of ecchymosis and tenderness to both lower extremities. Neurologic:  A&O x 3.    ASSESSMENT      1. Symptomatic Anemia: Pt. Received 2 units of PRBCs and is now receiving Fe+ infusions and tolerating well. Hgb stable at 8.7 as of yesterday and no result as of yet today. TSH stable. 2. Petechial Rash/Bruising: Pt. Evaluated by hematology with labs ordered to work up anti-phospholipid syndrome. May need Dermatology and Rheumatology consults as well. 3. HTN: Stable. Continue medication and continue to monitor. 4. Chronic Pain: Due to remote skiing trauma. Seen by Pain management. Pt. States adequate control with Dilaudid and Oxycodone. DVT Prophylaxis: SCDs ordered but not on patient.     FULL CODE    TRE Loredo

## 2017-03-02 NOTE — PROGRESS NOTES
Patient resting in bed, complaint of pain 6/10 given PO Percocet 10 mg x 2 tabs for pain, 25 mg Phenergan PO for complaint of nausea. Patient has legs elevated on pillows, large purpe bruised areas behind both knees and top of calf, petechiae covering both lower extremities entirely, reports legs are sensitive and painful.

## 2017-03-02 NOTE — PROGRESS NOTES
Patient has rested comfortable throughout the shift, A/o x 4, has not complained of nausea since noon med pass. Asks for pain medication a regular intervals alternating Dilaudid IV and Percocet tabs. Rounded on every hour, no needs at this time.

## 2017-03-03 LAB
ANION GAP BLD CALC-SCNC: 13 MMOL/L (ref 7–16)
B2 GLYCOPROT1 IGA SER-ACNC: <9 GPI IGA UNITS (ref 0–25)
BASOPHILS # BLD AUTO: 0.1 K/UL (ref 0–0.2)
BASOPHILS NFR BLD MANUAL: 1 % (ref 0–2)
BUN SERPL-MCNC: 16 MG/DL (ref 6–23)
CALCIUM SERPL-MCNC: 8.3 MG/DL (ref 8.3–10.4)
CARDIOLIPIN IGA SER IA-ACNC: <9 APL U/ML (ref 0–11)
CARDIOLIPIN IGG SER IA-ACNC: <9 GPL U/ML (ref 0–14)
CARDIOLIPIN IGM SER IA-ACNC: <9 MPL U/ML (ref 0–12)
CHLORIDE SERPL-SCNC: 101 MMOL/L (ref 98–107)
CO2 SERPL-SCNC: 22 MMOL/L (ref 21–32)
CREAT SERPL-MCNC: 0.84 MG/DL (ref 0.8–1.5)
DIFFERENTIAL METHOD BLD: ABNORMAL
EOSINOPHIL # BLD: 0.1 K/UL (ref 0–0.8)
EOSINOPHIL NFR BLD MANUAL: 1 % (ref 1–8)
ERYTHROCYTE [DISTWIDTH] IN BLOOD BY AUTOMATED COUNT: 17.3 % (ref 11.9–14.6)
GLUCOSE SERPL-MCNC: 114 MG/DL (ref 65–100)
HCT VFR BLD AUTO: 27.1 % (ref 41.1–50.3)
HGB BLD-MCNC: 8.3 G/DL (ref 13.6–17.2)
LYMPHOCYTES # BLD: 4.1 K/UL (ref 0.5–4.6)
LYMPHOCYTES NFR BLD MANUAL: 38 % (ref 16–44)
MCH RBC QN AUTO: 25.3 PG (ref 26.1–32.9)
MCHC RBC AUTO-ENTMCNC: 30.6 G/DL (ref 31.4–35)
MCV RBC AUTO: 82.6 FL (ref 79.6–97.8)
MONOCYTES # BLD: 0.8 K/UL (ref 0.1–1.3)
MONOCYTES NFR BLD MANUAL: 7 % (ref 3–9)
NEUTS SEG # BLD: 5.6 K/UL (ref 1.7–8.2)
NEUTS SEG NFR BLD MANUAL: 53 % (ref 47–75)
NRBC BLD-RTO: 3 PER 100 WBC
PLATELET # BLD AUTO: 402 K/UL (ref 150–450)
PLATELET COMMENTS,PCOM: ABNORMAL
PMV BLD AUTO: 9.5 FL (ref 10.8–14.1)
POTASSIUM SERPL-SCNC: 4.9 MMOL/L (ref 3.5–5.1)
RBC # BLD AUTO: 3.28 M/UL (ref 4.23–5.67)
RBC MORPH BLD: ABNORMAL
SODIUM SERPL-SCNC: 136 MMOL/L (ref 136–145)
WBC # BLD AUTO: 10.7 K/UL (ref 4.3–11.1)

## 2017-03-03 PROCEDURE — 74011250636 HC RX REV CODE- 250/636: Performed by: INTERNAL MEDICINE

## 2017-03-03 PROCEDURE — 74011250637 HC RX REV CODE- 250/637: Performed by: INTERNAL MEDICINE

## 2017-03-03 PROCEDURE — 65270000029 HC RM PRIVATE

## 2017-03-03 RX ORDER — HYDRALAZINE HYDROCHLORIDE 20 MG/ML
10 INJECTION INTRAMUSCULAR; INTRAVENOUS
Status: DISCONTINUED | OUTPATIENT
Start: 2017-03-03 | End: 2017-03-04 | Stop reason: HOSPADM

## 2017-03-03 RX ORDER — ACETAMINOPHEN 325 MG/1
650 TABLET ORAL
Status: DISCONTINUED | OUTPATIENT
Start: 2017-03-03 | End: 2017-03-04 | Stop reason: HOSPADM

## 2017-03-03 RX ORDER — HYDROMORPHONE HYDROCHLORIDE 1 MG/ML
0.5 INJECTION, SOLUTION INTRAMUSCULAR; INTRAVENOUS; SUBCUTANEOUS
Status: DISCONTINUED | OUTPATIENT
Start: 2017-03-03 | End: 2017-03-04 | Stop reason: HOSPADM

## 2017-03-03 RX ORDER — GABAPENTIN 300 MG/1
300 CAPSULE ORAL EVERY 8 HOURS
Status: DISCONTINUED | OUTPATIENT
Start: 2017-03-03 | End: 2017-03-04 | Stop reason: HOSPADM

## 2017-03-03 RX ADMIN — OXYCODONE HYDROCHLORIDE AND ACETAMINOPHEN 2 TABLET: 10; 325 TABLET ORAL at 19:01

## 2017-03-03 RX ADMIN — TEMAZEPAM 30 MG: 15 CAPSULE ORAL at 20:53

## 2017-03-03 RX ADMIN — ONDANSETRON HYDROCHLORIDE 4 MG: 2 SOLUTION INTRAMUSCULAR; INTRAVENOUS at 06:03

## 2017-03-03 RX ADMIN — OXYCODONE HYDROCHLORIDE AND ACETAMINOPHEN 2 TABLET: 10; 325 TABLET ORAL at 00:56

## 2017-03-03 RX ADMIN — OXYCODONE HYDROCHLORIDE AND ACETAMINOPHEN 2 TABLET: 10; 325 TABLET ORAL at 10:40

## 2017-03-03 RX ADMIN — TIZANIDINE 4 MG: 2 TABLET ORAL at 01:48

## 2017-03-03 RX ADMIN — ONDANSETRON HYDROCHLORIDE 4 MG: 2 SOLUTION INTRAMUSCULAR; INTRAVENOUS at 21:06

## 2017-03-03 RX ADMIN — PROPRANOLOL HYDROCHLORIDE 120 MG: 60 CAPSULE, EXTENDED RELEASE ORAL at 20:53

## 2017-03-03 RX ADMIN — TIZANIDINE 4 MG: 2 TABLET ORAL at 17:21

## 2017-03-03 RX ADMIN — ONDANSETRON HYDROCHLORIDE 4 MG: 2 SOLUTION INTRAMUSCULAR; INTRAVENOUS at 01:47

## 2017-03-03 RX ADMIN — HYDROMORPHONE HYDROCHLORIDE 0.5 MG: 1 INJECTION, SOLUTION INTRAMUSCULAR; INTRAVENOUS; SUBCUTANEOUS at 20:54

## 2017-03-03 RX ADMIN — HYDROMORPHONE HYDROCHLORIDE 0.5 MG: 1 INJECTION, SOLUTION INTRAMUSCULAR; INTRAVENOUS; SUBCUTANEOUS at 16:21

## 2017-03-03 RX ADMIN — TIZANIDINE 4 MG: 2 TABLET ORAL at 11:45

## 2017-03-03 RX ADMIN — GABAPENTIN 300 MG: 300 CAPSULE ORAL at 14:55

## 2017-03-03 RX ADMIN — HYDROMORPHONE HYDROCHLORIDE 1 MG: 1 INJECTION, SOLUTION INTRAMUSCULAR; INTRAVENOUS; SUBCUTANEOUS at 01:47

## 2017-03-03 RX ADMIN — AMLODIPINE BESYLATE 10 MG: 10 TABLET ORAL at 20:53

## 2017-03-03 RX ADMIN — OXYCODONE HYDROCHLORIDE AND ACETAMINOPHEN 2 TABLET: 10; 325 TABLET ORAL at 05:21

## 2017-03-03 RX ADMIN — TEMAZEPAM 30 MG: 15 CAPSULE ORAL at 00:55

## 2017-03-03 RX ADMIN — PROMETHAZINE HYDROCHLORIDE 25 MG: 25 TABLET ORAL at 10:40

## 2017-03-03 RX ADMIN — HYDROMORPHONE HYDROCHLORIDE 1 MG: 1 INJECTION, SOLUTION INTRAMUSCULAR; INTRAVENOUS; SUBCUTANEOUS at 06:03

## 2017-03-03 RX ADMIN — HYDROMORPHONE HYDROCHLORIDE 1 MG: 1 INJECTION, SOLUTION INTRAMUSCULAR; INTRAVENOUS; SUBCUTANEOUS at 11:45

## 2017-03-03 RX ADMIN — SODIUM CHLORIDE 75 ML/HR: 900 INJECTION, SOLUTION INTRAVENOUS at 04:35

## 2017-03-03 RX ADMIN — OXYCODONE HYDROCHLORIDE AND ACETAMINOPHEN 2 TABLET: 10; 325 TABLET ORAL at 23:08

## 2017-03-03 RX ADMIN — TIZANIDINE 4 MG: 2 TABLET ORAL at 05:21

## 2017-03-03 RX ADMIN — PANTOPRAZOLE SODIUM 40 MG: 40 TABLET, DELAYED RELEASE ORAL at 05:21

## 2017-03-03 RX ADMIN — GABAPENTIN 300 MG: 300 CAPSULE ORAL at 20:54

## 2017-03-03 RX ADMIN — OXYCODONE HYDROCHLORIDE AND ACETAMINOPHEN 2 TABLET: 10; 325 TABLET ORAL at 14:50

## 2017-03-03 RX ADMIN — LEVOTHYROXINE SODIUM 200 MCG: 100 TABLET ORAL at 20:54

## 2017-03-03 RX ADMIN — ONDANSETRON HYDROCHLORIDE 4 MG: 2 SOLUTION INTRAMUSCULAR; INTRAVENOUS at 17:21

## 2017-03-03 NOTE — PROGRESS NOTES
Patient called for pain medication gave 0.5 mg Dilaudid IV for pain, patient was questioning why he was being discharged tommorrow and stated he did not feel like the Dr.'s were addressing his pain or immobility issues. Nurse offered to page Both The Hospitalist and Dr. Jason Ornelas if patient needed to discuss any details of his medical condition, patient declined said\" he did not want to bother them\". Patient continued to question about being discharged, again the offer to page the DrRed's was offered and patient decline.

## 2017-03-03 NOTE — PROGRESS NOTES
Percocet 20 mg po for c/o pain at 9. He is very uptight and anxious.   He said his blood pressure goes up when he gets like this,

## 2017-03-03 NOTE — PROGRESS NOTES
Pain Medicine Follow-up Note    Patient: Sury Aguilar MRN: [id number]  SSN: [id number]    YOB: 1970  Age: 51 y.o. Sex: male      Assessment:     The patient was seen in consultation at the request of Shaheed Hernadez. Adrienne Venegas MD regarding assistance with pain management. The patient is obtaining adequate relief on the current regimen. Recommendations:     -Discontinue hydromorphone IV. Patient would like to wait until the am to make this change.  -Start Exalgo (long acting hydromorphone pill) 12 mg qam (represents MME of 45 compared to currently available IV MME of 56)  -Discontinue Percocet to separate acetaminophen from oxycodone. PATIENT CURRENTLY HAS TOO MUCH ACETAMINOPHEN AVAILABLE. -Start oxycodone 10-20 mg q4hrs PRN  -Continue acetaminophen 650 mq schedule q4hrs while awake  -Start ibuprofen 600 mg scheduled q8hrs, if appropriate from a medical standpoint  -Continue gabapentin 300 mg scheduled q8hrs  -Follow up with outpatient pain doctor, Dr. Paula Graham, after discharge, which is apparently scheduled for tomorrow. Subjective:      No acute events overnight. Pain scores between 5 and 8 over past 24 hrs. The patient feels their pain has slightly improved. For the pain, the patient is currently on:    Scheduled:  Acetaminophen 650 mg q4hrs while awake  Gabapentin 300 mg q8hrs    PRN:  Hydromorphone IV 0.5 mg q4hrs  Percocet 10/325 mg q4hrs    Prior to admission, the patient was on home opiate medications. He has been followed by a pain clinic. He is a patient of Dr. Paula Graham and has been treated for multiple chronic pains including back, hip, knee, abdominal, leg, and headache pain. He was prescribed a regimen of MS Contin 100 mg BID and Percocet 10/325 mg two tabs TID PRN, however, he had only been taking the Percocet for the past 2-3 months for a morphine milligram equivalent of 90/day.       Objective:     Vitals:    03/03/17 0605 03/03/17 0807 03/03/17 1140 03/03/17 1523   BP: 91/68 95/77 (!) 148/96 156/77   Pulse:  68 71 68   Resp:  18 16 18   Temp:  96.4 °F (35.8 °C) 97.5 °F (36.4 °C) 96.6 °F (35.9 °C)   SpO2:  99%  99%   Weight:       Height:            Physical Exam:  GENERAL: alert, cooperative, no distress, appears stated age  THROAT & NECK: normal and no erythema or exudates noted. LUNG: clear to auscultation bilaterally  HEART: regular rate and rhythm  ABDOMEN: soft, non-tender. Bowel sounds normal. No masses, no organomegaly  EXTREMITIES: Edema of BLE  SKIN: visible and palpable rash, and bruising noted in BLE  NEUROLOGIC: negative  PSYCHIATRIC: appropriate mood and affect    Labs:  Recent Results (from the past 24 hour(s))   CBC WITH AUTOMATED DIFF    Collection Time: 03/02/17 11:53 PM   Result Value Ref Range    WBC 10.7 4.3 - 11.1 K/uL    RBC 3.28 (L) 4.23 - 5.67 M/uL    HGB 8.3 (L) 13.6 - 17.2 g/dL    HCT 27.1 (L) 41.1 - 50.3 %    MCV 82.6 79.6 - 97.8 FL    MCH 25.3 (L) 26.1 - 32.9 PG    MCHC 30.6 (L) 31.4 - 35.0 g/dL    RDW 17.3 (H) 11.9 - 14.6 %    PLATELET 438 480 - 560 K/uL    MPV 9.5 (L) 10.8 - 14.1 FL    NEUTROPHILS 53 47 - 75 %    LYMPHOCYTES 38 16 - 44 %    MONOCYTES 7 3 - 9 %    EOSINOPHILS 1 1 - 8 %    BASOPHILS 1 0 - 2 %    NRBC 3.0  WBC    ABS. NEUTROPHILS 5.6 1.7 - 8.2 K/UL    ABS. LYMPHOCYTES 4.1 0.5 - 4.6 K/UL    ABS. MONOCYTES 0.8 0.1 - 1.3 K/UL    ABS. EOSINOPHILS 0.1 0.0 - 0.8 K/UL    ABS.  BASOPHILS 0.1 0.0 - 0.2 K/UL    RBC COMMENTS SLIGHT  ANISOCYTOSIS + POIKILOCYTOSIS        RBC COMMENTS OCCASIONAL  SCHISTOCYTES        RBC COMMENTS MODERATE  POLYCHROMASIA        PLATELET COMMENTS INCREASED      DF MANUAL     METABOLIC PANEL, BASIC    Collection Time: 03/02/17 11:53 PM   Result Value Ref Range    Sodium 136 136 - 145 mmol/L    Potassium 4.9 3.5 - 5.1 mmol/L    Chloride 101 98 - 107 mmol/L    CO2 22 21 - 32 mmol/L    Anion gap 13 7 - 16 mmol/L    Glucose 114 (H) 65 - 100 mg/dL    BUN 16 6 - 23 MG/DL    Creatinine 0.84 0.8 - 1.5 MG/DL GFR est AA >60 >60 ml/min/1.73m2    GFR est non-AA >60 >60 ml/min/1.73m2    Calcium 8.3 8.3 - 10.4 MG/DL       Hospital Problems  Never Reviewed          Codes Class Noted POA    * (Principal)Symptomatic anemia ICD-10-CM: D64.9  ICD-9-CM: 285.9  2/27/2017 Yes        Petechial rash ICD-10-CM: R23.3  ICD-9-CM: 782.7  2/27/2017 Yes        Bruising ICD-10-CM: T14.8  ICD-9-CM: 924.9  2/27/2017 Yes        Hyponatremia ICD-10-CM: E87.1  ICD-9-CM: 276.1  7/19/2014 Yes        Hypertension ICD-10-CM: I10  ICD-9-CM: 401.9  7/17/2014 Yes        Anemia ICD-10-CM: D64.9  ICD-9-CM: 285.9  7/9/2014 Yes    Overview Signed 7/15/2014  7:01 PM by Josafat Phillip     With hemolysis, elevated bili, high LDH             Hypothyroidism ICD-10-CM: E03.9  ICD-9-CM: 244.9  7/9/2014 Yes        Pain in both lower extremities ICD-10-CM: M79.604, M79.605  ICD-9-CM: 729.5  7/9/2014 Yes    Overview Signed 7/9/2014 11:09 PM by Malgorzata Bird     left                   Signed By: Jennifer Alvarez MD     March 3, 2017

## 2017-03-03 NOTE — ADT AUTH CERT NOTES
Care Day: 2 Care Date: 2/28/2017 Level of Care: Inpatient Floor        Guideline Day 2        Clinical Status       ( ) * Hemodynamic stability       (X) * Active blood loss absent       (X) * Signs and symptoms of anemia improved or absent       (X) * Hgb level acceptable and stable       (X) * Underlying disorder absent or controlled       ( ) * Discharge plans and education understood              Activity       ( ) * Ambulatory [G]              Routes       (X) * Oral hydration, medications, and diet              Interventions       (X) Hgb/Hct                                   * Milestone              Additional Notes       CONT STAY REVIEW FOR 2/28/17              Hospitalist Progress Note         SUBJECTIVE:       Mr. Randolph Alvarado is a 52 yo male who presented with c/o bilateral LE aching, back pain and HA (reports HA are chronic). Also c/o thigh bruising. Reports symptoms started on Valentine's Day and has progressively worsened. He was found to be anemia pablo hgb 6.3, WBC 16.5, Platelets 227. Reports similar episode in 2014 and required multiple units of PRBC. He reports being seen by Hematology, Rheumatology, Dermatology with negative work up. States the rash is chronic and initially started after a leg injury in 2003 and has never went away. Pt requests to increase IV dilaudid to 2mg every 3 hours in addition to his home meds of percocet 20 mg every 2-4 hours and morphine po however not listed on PTA med list. Pt requesting increase of dilaudid for HA however HA are chronic, unwilling to try non narcotic medication for HA. Hematology consulted. Denies CP, SOB, N/V, diarrhea.        Plan:        Symptomatic anemia: pt received 2 units PRBCs, awaiting CBC result today. Hematology consulted.               Petechial rash/bruising: Hematology consulted. May need Dermatology for guidance in the future               HTN: chronic, elevated.  Hydralazine prn        Time spent with pt 30 min       Notes, labs, VS, diagnostic testing reviewed               DVT Prophylaxis: contraindicated (no anticoagulation due to anemia, no SCDs due to unknown etiology of LE bruising)       HGB: 9.0 (L)       BCX       T 100.5, /97, , PAIN 10/10, SCDS, NS 75/HR, NORVASC, DILAUDID IV, ZOFRAN IV,       PERCOCET, ROCEPHIN IV, APRESOLINE IV, PHENERGAN PO, MS IV

## 2017-03-03 NOTE — PROGRESS NOTES
Hospitalist Progress Note    3/3/2017  Admit Date: 2017  9:22 PM   NAME: Leonor Diop   :  1970   MRN:  [id number]   Attending: Collin Hull MD  PCP:  PROVIDER UNKNOWN    SUBJECTIVE:   Mr. Bishnu Chowdhury is a 50 yo male who presented with c/o bilateral LE aching, back pain and HA (reports HA are chronic). Also c/o thigh bruising. Reports rash started on 's Day and has progressively worsened. He was found to be anemia pablo hgb 6.3, WBC 16.5, Platelets 835. Reports similar episode in  and required multiple units of PRBC. He reports being seen by Hematology, Rheumatology, Dermatology with no final diagnosis. Does have chronic pain but reports significant increase in pain with this flaring of his BLE rash and bruising. Reports better pain control overnight and was able to get a little bit of sleep. 3/2/2017: Pt. Seen by hematologist and work-up is continued. He states he has pain to right inner thigh with area of swelling present. He states he was up x 1 to bathroom but had severe pain in both legs with elevation in BP due to pain. He states he still feels somewhat weak but improved overall from initially. Denies chills, fever, nausea, vomiting, chest pain or SOB. Tolerating Fe+ infusion well.    2017- pt seen- H&H stable.  Pain improved      Review of Systems negative with exception of pertinent positives noted above  PHYSICAL EXAM     Visit Vitals    BP (!) 148/96    Pulse 71    Temp 97.5 °F (36.4 °C)    Resp 16    Ht 5' 7\" (1.702 m)    Wt 95.3 kg (210 lb)    SpO2 99%    BMI 32.89 kg/m2      Temp (24hrs), Av °F (36.1 °C), Min:95.7 °F (35.4 °C), Max:98 °F (36.7 °C)    Oxygen Therapy  O2 Sat (%): 99 % (17 0807)  Pulse via Oximetry: 95 beats per minute (17 0020)  O2 Device: Room air (17 0200)    Intake/Output Summary (Last 24 hours) at 17 5557  Last data filed at 17 0800   Gross per 24 hour   Intake             2840 ml   Output 2400 ml   Net              440 ml      General: Sitting upright in bed with wife at bedside in NAD. Lungs:  CTA Bilaterally. Heart:  Regular rate and rhythm,  No murmur, rub, or gallop  Abdomen: Soft, Non distended, Non tender, Positive bowel sounds  Extremities: Petechial lesions with larger areas of ecchymosis and tenderness to both lower extremities. Neurologic:  A&O x 3.    ASSESSMENT      1. Symptomatic Anemia: Pt. Received 2 units of PRBCs and is now receiving Fe+ infusions and tolerating well. Hgb stable at around 8am    2. Petechial Rash/Bruising: Pt. Evaluated by hematology with labs ordered to work up anti-phospholipid syndrome. D/w Dr. Nancy Luna- can discharge from a hematology standpoint with further workup and management as outpt. SW can set up next iv iron infusion and he will see him the week after. 3. HTN: Stable. Continue medication and continue to monitor. 4. Chronic Pain: Due to remote skiing trauma. Seen by Pain management. Pt. Can have Iv dilaudid while awaiting a definitive dischage plan. Plan to discharge tomorrow if stable- will start weaning the Dilaudid. Continue percocet, start tylenol and gabapentin      DVT Prophylaxis: SCDs ordered but not on patient.     Dc Garvey MD

## 2017-03-03 NOTE — PROGRESS NOTES
Dilaudid 1 mg iv and zofran 4 mg iv for c/o pain and nausea at 8. He is more relaxed. B/p 91/68. He said he did this all the time. Go up and down on b/p   no changes.

## 2017-03-03 NOTE — PROGRESS NOTES
Percocet 10mg-325 mg x2 for c/o pain at 8. Discussed his pain meds and time frame. He has bruises and petechiae on both legs from thigh to feet. His feet and legs are very sensitive. He jumps and cries out when touched. He has edema in both feet. Non-pitting. Ask for his iv pain med at 0145 with nausea med. Restoril 30 mg po for sleep.

## 2017-03-03 NOTE — PROGRESS NOTES
Assessment complete. Patient resting in bed with wife at bedside. BLE elevated on  Pillows, states he has extreme pain and swelling in his posterior legs. Rash, petechiae, and ecchymosis noted to BLE. Pulses +2 in BLE. MOvement and sensation present. Patient stated he had a BM yesterday. Nausea has been better today, has taken zofran and phenergan per MAR. Very specific about pain medication and when it is due and when he expects it. He is requesting to alternate his Percocet and IV dilaudid to where he receives either every hour and a half. Voiding kalen colored urine in urinal. Lab had a very difficult time getting his blood draw due to his extreme anxiety with needles.

## 2017-03-04 VITALS
HEART RATE: 78 BPM | RESPIRATION RATE: 18 BRPM | HEIGHT: 67 IN | OXYGEN SATURATION: 97 % | BODY MASS INDEX: 32.96 KG/M2 | TEMPERATURE: 96.9 F | DIASTOLIC BLOOD PRESSURE: 88 MMHG | SYSTOLIC BLOOD PRESSURE: 129 MMHG | WEIGHT: 210 LBS

## 2017-03-04 PROCEDURE — 74011250636 HC RX REV CODE- 250/636: Performed by: INTERNAL MEDICINE

## 2017-03-04 PROCEDURE — 74011250637 HC RX REV CODE- 250/637: Performed by: INTERNAL MEDICINE

## 2017-03-04 RX ORDER — PREDNISONE 20 MG/1
60 TABLET ORAL
Status: DISCONTINUED | OUTPATIENT
Start: 2017-03-05 | End: 2017-03-04 | Stop reason: HOSPADM

## 2017-03-04 RX ORDER — OXYCODONE HYDROCHLORIDE 10 MG/1
TABLET ORAL
Qty: 180 TAB | Refills: 0 | Status: SHIPPED | OUTPATIENT
Start: 2017-03-04

## 2017-03-04 RX ORDER — HYDROMORPHONE HYDROCHLORIDE 12 MG/1
12 TABLET, EXTENDED RELEASE ORAL DAILY
Qty: 30 TAB | Refills: 0 | Status: SHIPPED | OUTPATIENT
Start: 2017-03-04

## 2017-03-04 RX ORDER — HYDROMORPHONE HYDROCHLORIDE 12 MG/1
12 TABLET, EXTENDED RELEASE ORAL DAILY
Qty: 30 TAB | Refills: 0 | Status: SHIPPED | OUTPATIENT
Start: 2017-03-04 | End: 2017-03-04

## 2017-03-04 RX ORDER — ACETAMINOPHEN 325 MG/1
650 TABLET ORAL
Qty: 60 TAB | Refills: 1 | Status: SHIPPED | OUTPATIENT
Start: 2017-03-04

## 2017-03-04 RX ORDER — OXYCODONE HYDROCHLORIDE 10 MG/1
TABLET ORAL
Qty: 30 TAB | Refills: 0 | Status: SHIPPED | OUTPATIENT
Start: 2017-03-04 | End: 2017-03-04

## 2017-03-04 RX ORDER — GABAPENTIN 300 MG/1
300 CAPSULE ORAL EVERY 8 HOURS
Qty: 21 CAP | Refills: 0 | Status: SHIPPED | OUTPATIENT
Start: 2017-03-04 | End: 2017-03-04

## 2017-03-04 RX ORDER — CALCIUM CARBONATE 200(500)MG
2 TABLET,CHEWABLE ORAL
Qty: 112 TAB | Refills: 1 | Status: SHIPPED | OUTPATIENT
Start: 2017-03-04 | End: 2017-03-11

## 2017-03-04 RX ORDER — OXYCODONE HYDROCHLORIDE 5 MG/1
10 TABLET ORAL
Status: DISCONTINUED | OUTPATIENT
Start: 2017-03-04 | End: 2017-03-04 | Stop reason: HOSPADM

## 2017-03-04 RX ORDER — PREDNISONE 20 MG/1
TABLET ORAL
Qty: 28 TAB | Refills: 0 | Status: SHIPPED | OUTPATIENT
Start: 2017-03-04 | End: 2017-03-04

## 2017-03-04 RX ORDER — OXYCODONE HYDROCHLORIDE 10 MG/1
TABLET ORAL
Qty: 180 TAB | Refills: 0 | Status: SHIPPED | OUTPATIENT
Start: 2017-03-04 | End: 2017-03-04

## 2017-03-04 RX ORDER — ACETAMINOPHEN 325 MG/1
650 TABLET ORAL
Qty: 90 TAB | Refills: 1 | Status: SHIPPED | OUTPATIENT
Start: 2017-03-04 | End: 2017-03-04

## 2017-03-04 RX ORDER — GABAPENTIN 300 MG/1
300 CAPSULE ORAL EVERY 8 HOURS
Qty: 90 CAP | Refills: 0 | Status: SHIPPED | OUTPATIENT
Start: 2017-03-04 | End: 2017-03-04

## 2017-03-04 RX ORDER — HYDROMORPHONE HYDROCHLORIDE 12 MG/1
12 TABLET, EXTENDED RELEASE ORAL DAILY
Qty: 7 TAB | Refills: 0 | Status: SHIPPED | OUTPATIENT
Start: 2017-03-04 | End: 2017-03-04

## 2017-03-04 RX ORDER — ACETAMINOPHEN 325 MG/1
650 TABLET ORAL
Qty: 30 TAB | Refills: 0 | Status: SHIPPED | OUTPATIENT
Start: 2017-03-04 | End: 2017-03-04

## 2017-03-04 RX ORDER — HYDROCHLOROTHIAZIDE 25 MG/1
25 TABLET ORAL DAILY
Status: DISCONTINUED | OUTPATIENT
Start: 2017-03-05 | End: 2017-03-04 | Stop reason: HOSPADM

## 2017-03-04 RX ORDER — GABAPENTIN 300 MG/1
300 CAPSULE ORAL EVERY 8 HOURS
Qty: 90 CAP | Refills: 0 | Status: SHIPPED | OUTPATIENT
Start: 2017-03-04 | End: 2017-04-03

## 2017-03-04 RX ORDER — HYDROCHLOROTHIAZIDE 25 MG/1
25 TABLET ORAL DAILY
Qty: 30 TAB | Refills: 0 | Status: SHIPPED | OUTPATIENT
Start: 2017-03-05 | End: 2017-03-04

## 2017-03-04 RX ADMIN — SODIUM CHLORIDE 75 ML/HR: 900 INJECTION, SOLUTION INTRAVENOUS at 08:54

## 2017-03-04 RX ADMIN — GABAPENTIN 300 MG: 300 CAPSULE ORAL at 05:27

## 2017-03-04 RX ADMIN — HYDROMORPHONE HYDROCHLORIDE 0.5 MG: 1 INJECTION, SOLUTION INTRAMUSCULAR; INTRAVENOUS; SUBCUTANEOUS at 13:09

## 2017-03-04 RX ADMIN — TIZANIDINE 4 MG: 2 TABLET ORAL at 00:55

## 2017-03-04 RX ADMIN — TIZANIDINE 4 MG: 2 TABLET ORAL at 05:27

## 2017-03-04 RX ADMIN — HYDROMORPHONE HYDROCHLORIDE 0.5 MG: 1 INJECTION, SOLUTION INTRAMUSCULAR; INTRAVENOUS; SUBCUTANEOUS at 00:55

## 2017-03-04 RX ADMIN — OXYCODONE HYDROCHLORIDE 10 MG: 5 TABLET ORAL at 11:12

## 2017-03-04 RX ADMIN — TIZANIDINE 4 MG: 2 TABLET ORAL at 13:12

## 2017-03-04 RX ADMIN — ONDANSETRON HYDROCHLORIDE 4 MG: 2 SOLUTION INTRAMUSCULAR; INTRAVENOUS at 09:28

## 2017-03-04 RX ADMIN — HYDRALAZINE HYDROCHLORIDE 10 MG: 20 INJECTION INTRAMUSCULAR; INTRAVENOUS at 13:14

## 2017-03-04 RX ADMIN — HYDROMORPHONE HYDROCHLORIDE 0.5 MG: 1 INJECTION, SOLUTION INTRAMUSCULAR; INTRAVENOUS; SUBCUTANEOUS at 16:43

## 2017-03-04 RX ADMIN — HYDROMORPHONE HYDROCHLORIDE 0.5 MG: 1 INJECTION, SOLUTION INTRAMUSCULAR; INTRAVENOUS; SUBCUTANEOUS at 05:27

## 2017-03-04 RX ADMIN — ONDANSETRON HYDROCHLORIDE 4 MG: 2 SOLUTION INTRAMUSCULAR; INTRAVENOUS at 05:26

## 2017-03-04 RX ADMIN — PANTOPRAZOLE SODIUM 40 MG: 40 TABLET, DELAYED RELEASE ORAL at 05:28

## 2017-03-04 RX ADMIN — ONDANSETRON HYDROCHLORIDE 4 MG: 2 SOLUTION INTRAMUSCULAR; INTRAVENOUS at 00:55

## 2017-03-04 RX ADMIN — GABAPENTIN 300 MG: 300 CAPSULE ORAL at 16:23

## 2017-03-04 RX ADMIN — GABAPENTIN 300 MG: 300 CAPSULE ORAL at 09:24

## 2017-03-04 RX ADMIN — HYDROMORPHONE HYDROCHLORIDE 0.5 MG: 1 INJECTION, SOLUTION INTRAMUSCULAR; INTRAVENOUS; SUBCUTANEOUS at 09:23

## 2017-03-04 NOTE — DISCHARGE INSTRUCTIONS
Anemia: Care Instructions  Your Care Instructions    Anemia is a low level of red blood cells, which carry oxygen throughout your body. Many things can cause anemia. Lack of iron is one of the most common causes. Your body needs iron to make hemoglobin, a substance in red blood cells that carries oxygen from the lungs to your body's cells. Without enough iron, the body produces fewer and smaller red blood cells. As a result, your body's cells do not get enough oxygen, and you feel tired and weak. And you may have trouble concentrating. Bleeding is the most common cause of a lack of iron. You may have heavy menstrual bleeding or bleeding caused by conditions such as ulcers, hemorrhoids, or cancer. Regular use of aspirin or other anti-inflammatory medicines (such as ibuprofen) also can cause bleeding in some people. A lack of iron in your diet also can cause anemia, especially at times when the body needs more iron, such as during pregnancy, infancy, and the teen years. Your doctor may have prescribed iron pills. It may take several months of treatment for your iron levels to return to normal. Your doctor also may suggest that you eat foods that are rich in iron, such as meat and beans. There are many other causes of anemia. It is not always due to a lack of iron. Finding the specific cause of your anemia will help your doctor find the right treatment for you. Follow-up care is a key part of your treatment and safety. Be sure to make and go to all appointments, and call your doctor if you are having problems. It's also a good idea to know your test results and keep a list of the medicines you take. How can you care for yourself at home? · Take your medicines exactly as prescribed. Call your doctor if you think you are having a problem with your medicine. · If your doctor recommends iron pills, take them as directed:  ¨ Try to take the pills on an empty stomach about 1 hour before or 2 hours after meals.  But you may need to take iron with food to avoid an upset stomach. ¨ Do not take antacids or drink milk or caffeine drinks (such as coffee, tea, or cola) at the same time or within 2 hours of the time that you take your iron. They can make it hard for your body to absorb the iron. ¨ Vitamin C (from food or supplements) helps your body absorb iron. Try taking iron pills with a glass of orange juice or some other food that is high in vitamin C, such as citrus fruits. ¨ Iron pills may cause stomach problems, such as heartburn, nausea, diarrhea, constipation, and cramps. Be sure to drink plenty of fluids, and include fruits, vegetables, and fiber in your diet each day. Iron pills often make your bowel movements dark or green. ¨ If you forget to take an iron pill, do not take a double dose of iron the next time you take a pill. ¨ Keep iron pills out of the reach of small children. An overdose of iron can be very dangerous. · Follow your doctor's advice about eating iron-rich foods. These include red meat, shellfish, poultry, eggs, beans, raisins, whole-grain bread, and leafy green vegetables. · Steam vegetables to help them keep their iron content. When should you call for help? Call 911 anytime you think you may need emergency care. For example, call if:  · You have symptoms of a heart attack. These may include:  ¨ Chest pain or pressure, or a strange feeling in the chest.  ¨ Sweating. ¨ Shortness of breath. ¨ Nausea or vomiting. ¨ Pain, pressure, or a strange feeling in the back, neck, jaw, or upper belly or in one or both shoulders or arms. ¨ Lightheadedness or sudden weakness. ¨ A fast or irregular heartbeat. After you call 911, the  may tell you to chew 1 adult-strength or 2 to 4 low-dose aspirin. Wait for an ambulance. Do not try to drive yourself. · You passed out (lost consciousness).   Call your doctor now or seek immediate medical care if:  · You have new or increased shortness of breath. · You are dizzy or lightheaded, or you feel like you may faint. · Your fatigue and weakness continue or get worse. · You have any abnormal bleeding, such as:  ¨ Nosebleeds. ¨ Vaginal bleeding that is different (heavier, more frequent, at a different time of the month) than what you are used to. ¨ Bloody or black stools, or rectal bleeding. ¨ Bloody or pink urine. Watch closely for changes in your health, and be sure to contact your doctor if:  · You do not get better as expected. Where can you learn more? Go to http://denise-marce.info/. Enter R301 in the search box to learn more about \"Anemia: Care Instructions. \"  Current as of: February 5, 2016  Content Version: 11.1  © 2933-4022 [employer]. Care instructions adapted under license by [employer] (which disclaims liability or warranty for this information). If you have questions about a medical condition or this instruction, always ask your healthcare professional. [address] any warranty or liability for your use of this information.

## 2017-03-04 NOTE — PROGRESS NOTES
[facility]  Face to Face Encounter    Patients Name: Lorna Rizvi    YOB: 1970    Ordering Physician: Chau Hammer      Primary Diagnosis: Symptomatic anemia    Date of Face to Face:   3/4/2017                                  Face to Face Encounter findings are related to primary reason for home care:   yes. 1. I certify that the patient needs intermittent care as follows: skilled nursing care:  administration of medications    2. I certify that this patient is homebound, that is: 1) patient requires the use of a patient is non-ambulatory device, special transportation, or assistance of another to leave the home; or 2) patient's condition makes leaving the home medically contraindicated; and 3) patient has a normal inability to leave the home and leaving the home requires considerable and taxing effort. Patient may leave the home for infrequent and short duration for medical reasons, and occasional absences for non-medical reasons. Homebound status is due to the following functional limitations: Patient's ambulation limited secondary to severe pain and requires the use of an assistive device and the assistance of a caregiver for safe completion. Patient with strength and ROM deficits limiting ambulation endurance requiring the use of an assistive device and the assistance of a caregiver. Patient deemed temporarily homebound secondary to increased risk for infection when leaving home and going out into the community. 3. I certify that this patient is under my care and that I, or a nurse practitioner or  [id number], or clinical nurse specialist, or certified nurse midwife, working with me, had a Face-to-Face Encounter that meets the physician Face-to-Face Encounter requirements. The following are the clinical findings from the [address] [place] [address] encounter that support the need for skilled services and is a summary of the encounter: See hospital chart.       See attached progess note      Schuyler Aly, TIBURCIO  3/4/2017      THE FOLLOWING TO BE COMPLETED BY THE COMMUNITY PHYSICIAN:    I concur with the findings described above from the F2F encounter that this patient is homebound and in need of a skilled service.     Certifying Physician: _____________________________________      Printed Certifying Physician Name: _____________________________________    Date: _________________

## 2017-03-04 NOTE — PROGRESS NOTES
Care Management Interventions  Transition of Care Consult (CM Consult): Home Health, [address]: Yes  Current Support Network: Lives with Spouse  Plan discussed with Pt/Family/Caregiver: Yes  Freedom of Choice Offered: Yes  Discharge Location  Discharge Placement: Home with home health  50 yo M with symptomatic anemia. Is a pain clinic patient. He is a patient of Dr. Jelena May and has been treated for multiple chronic pains including back, hip, knee, abdominal, leg, and headache pain. Discharged to home with spouse on 3/4. Dr. Amalia Zuñiga ordered one home IV iron infusion for Wednesday 3/8. Referral faxed to [facility]. Referral made to [facility].

## 2017-03-04 NOTE — DISCHARGE SUMMARY
Patient ID:  Yvon Jin  [id number]  51 y.o.  1970  Admit date: 2/27/2017  9:22 PM  Discharge date and time: 3/4/2017  Attending: Palak Arthur MD  PCP:  PROVIDER UNKNOWN  Treatment Team: Attending Provider: Palak Arthur MD; Consulting Provider: Sarah De Oliveira MD; Utilization Review: Yuval Arriaza RN; Consulting Provider: Dulce Maria Stevens MD    Principal Diagnosis Symptomatic anemia   Principal Problem:    Symptomatic anemia (2/27/2017)    Active Problems:    Anemia (7/9/2014)      Overview: With hemolysis, elevated bili, high LDH      Hypothyroidism (7/9/2014)      Pain in both lower extremities (7/9/2014)      Overview: left      Hypertension (7/17/2014)      Hyponatremia (7/19/2014)      Petechial rash (2/27/2017)      Bruising (2/27/2017)             Hospital Course:  Please refer to the admission H&P for details of presentation. In summary, Bianca Pinzon is a 50 yo M with pmhx of unexplained symptomatic anemia and thigh bruising, HTN, surgical hypothyroidism, who presents with 2 weeks of worsening bilateral thigh/leg aches, back pain, and headaches. Symptoms started on Palomares's Day and has worsened. He has had mild cough and shortness of breath, but he denies fevers at home or any other infectious symptoms. He is in significant discomfort currently and is requiring IV morphine for pain control.     The last time this happened was in 2014. He stayed in the hospital nearly 2 weeks in 2014 and required 7 pRBC transfusions over this time. He was seen by hematology, rheumatology, and dermatology. All work up negative. Upon further discussion, he has a palpable rash that has been present since a bad L leg injury while skiing in 2003. He had plates put in leg at that time. Rash occurred shortly thereafter and flared significantly in 2014 and presently when he had the anemia and bruising episodes. He denies any recent trauma prior to the leg bruising.     He was admitted and transfused 2 units PRBC's. He was seen by Hematology who started him on iv iron weekly. His Hb has remained stable around 8. Pain mgt has been an issue so his mgt service was consulted. They made pain mgt recommendations and allowed the pt to have IV dilaudid until a final dc plan could be made. With his hemoglobin stable plans were made for discharge today. However, once pt told about discharge and decreasing the Iv pain meds he became somnolent and has refused all blood draws. He refuses any blood draws today to check his hemoglobin. He will be discharged home as per pain mgt recommendations to follow up with Hematology as scheduled. He has a iron transfusion for Wednesday of next week. He will follow up with Dr. Ralph Hardy one week after the second transfusion. He has been given 1 month supply of pain meds as he says he does not have an appointment scheduled with Dr. Justino Bridges for 2 months. I have told him and his wife to call and arrange a follow up earlier than this. So that he does not run out of meds. They have voiced understanding. Significant Diagnostic Studies:   Portable chest xray      COMPARISON: July 14, 2014.     CLINICAL HISTORY: Shortness of breath. Fluid swelling.     FINDINGS:     Lungs are underinflated. No focal consolidation, pleural effusion or  pneumothorax. No pulmonary edema. Cardiac mediastinal contour is within normal  limits. Surrounding bones are unremarkable.  There are stable clips in the right  axilla.     IMPRESSION  IMPRESSION:     No pulmonary consolidation or pulmonary edema    Labs: Results:       Chemistry Recent Labs      03/02/17   2353  03/02/17   1058   GLU  114*  116*   NA  136  130*   K  4.9  4.2   CL  101  97*   CO2  22  23   BUN  16  13   CREA  0.84  0.84   CA  8.3  8.8   AGAP  13  10      CBC w/Diff Recent Labs      03/02/17   2353  03/02/17   1058  03/01/17   2230   WBC  10.7  9.7   --    RBC  3.28*  3.42*   --    HGB  8.3*  8.6*  8.7*   HCT  27.1*  28.1*   --    PLT 402  378   --    GRANS  53  83   --    LYMPH  38  15   --    EOS  1   --    --       Cardiac Enzymes No results for input(s): CPK, CKND1, PIERO in the last 72 hours. No lab exists for component: CKRMB, TROIP   Coagulation No results for input(s): PTP, INR, APTT in the last 72 hours. No lab exists for component: INREXT    Lipid Panel No results found for: CHOL, CHOLPOCT, CHOLX, CHLST, CHOLV, [id number], HDL, LDL, NLDLCT, DLDL, LDLC, DLDLP, [id number], VLDLC, VLDL, TGL, TGLX, TRIGL, [id number], TRIGP, TGLPOCT, [id number], CHHD, CHHDX   BNP No results for input(s): BNPP in the last 72 hours. Liver Enzymes No results for input(s): TP, ALB, TBIL, AP, SGOT, GPT in the last 72 hours. No lab exists for component: DBIL   Thyroid Studies Lab Results   Component Value Date/Time    T4, Total 12.8 01/24/2014 12:16 AM    TSH 0.794 02/27/2017 09:50 PM          Results for orders placed or performed during the hospital encounter of 07/09/14   EKG, 12 LEAD, INITIAL   Result Value Ref Range    Systolic BP  mmHg    Diastolic BP  mmHg    Ventricular Rate 90 BPM    Atrial Rate 90 BPM    P-R Interval 124 ms    QRS Duration 78 ms    Q-T Interval 350 ms    QTC Calculation (Bezet) 428 ms    Calculated P Axis 59 degrees    Calculated R Axis 34 degrees    Calculated T Axis 47 degrees    Diagnosis       Normal sinus rhythm  Normal ECG  When compared with ECG of 23-JAN-2014 19:22,  Vent. rate has decreased BY  45 BPM  Confirmed by Lexie Pedraza ([id number]) on 7/9/2014 10:46:17 PM     CT Results (most recent):    Results from [place] encounter on 01/23/14   CT HEAD WO CONT   Narrative HEAD CT WITHOUT CONTRAST  1/23/2014    HISTORY:   Hypertension  ; headaches    TECHNIQUE: Noncontrast axial images were obtained through the brain. COMPARISON: 8/18/11    FINDINGS: There is no acute intracranial hemorrhage, significant mass effect or  CT evidence of acute large artery territorial infarction.  Please note that a  hyperacute infarct or small vessel infarct may not be apparent on initial CT  imaging. There is no hydrocephalus , intra-axial mass or abnormal extra-axial fluid  collection. There are no displaced skull fractures. The mastoid air cells and  paranasal sinuses are clear where imaged. Impression IMPRESSION: No acute findings        VAS/US Results (most recent):    Results from Hospital Encounter encounter on 07/09/14   DUPLEX LOWER EXT VENOUS BILAT   Narrative HISTORY: Bilateral lower extremity swelling and pain. Shortness of breath. Exam: Bilateral lower extremity ultrasound    Technique: Realtime grayscale and color Doppler imaging is performed in the  longitudinal and transverse planes. FINDINGS: There is normal flow, augmentation, and compressibility within the  deep venous system from the groin to popliteal fossa. Posterior tibial vein and  peroneal vein are also normal. No Baker's cyst.    IMPRESSIONS: No evidence of deep venous thrombosis within the lower extremities. XR Results (most recent):    Results from Hospital Encounter encounter on 02/27/17   XR CHEST PORT   Narrative Portable chest xray      COMPARISON: July 14, 2014. CLINICAL HISTORY: Shortness of breath. Fluid swelling. FINDINGS:    Lungs are underinflated. No focal consolidation, pleural effusion or  pneumothorax. No pulmonary edema. Cardiac mediastinal contour is within normal  limits. Surrounding bones are unremarkable. There are stable clips in the right  axilla. Impression IMPRESSION:    No pulmonary consolidation or pulmonary edema. Discharge Exam:  Visit Vitals    /88    Pulse 78    Temp 96.9 °F (36.1 °C)    Resp 18    Ht 5' 7\" (1.702 m)    Wt 95.3 kg (210 lb)    SpO2 97%    BMI 32.89 kg/m2     General appearance: alert, cooperative, no distress, appears stated age  Lungs: clear to auscultation bilaterally  Heart: regular rate and rhythm, S1, S2 normal, no murmur, click, rub or gallop  Abdomen: soft, non-tender. Bowel sounds normal. No masses,  no organomegaly  Extremities: no cyanosis or edema  Neurologic: Grossly normal    Disposition: Home  Discharge Condition: stable  Patient Instructions:   Current Discharge Medication List      START taking these medications    Details   calcium carbonate (TUMS) 200 mg calcium (500 mg) chew Take 2 Tabs by mouth four (4) times daily (with meals) for 7 days. Qty: 112 Tab, Refills: 1      0.9% sodium chloride solp 250 mL with ferric carboxymaltose 50 iron mg/mL soln 750 mg 750 mg by IntraVENous route once for 1 dose. Qty: 1 Dose, Refills: 0      HYDROmorphone ER (EXALGO ER) 12 mg tablet Take 1 Tab by mouth daily. Max Daily Amount: 12 mg.  Qty: 30 Tab, Refills: 0      oxyCODONE IR (ROXICODONE) 10 mg tab immediate release tablet 1- 2 tabs po q4hr prn pain  Qty: 180 Tab, Refills: 0      acetaminophen (TYLENOL) 325 mg tablet Take 2 Tabs by mouth every four (4) hours (while awake). Do not take more than 6 pills daily  Qty: 60 Tab, Refills: 1      gabapentin (NEURONTIN) 300 mg capsule Take 1 Cap by mouth every eight (8) hours for 30 days. Qty: 90 Cap, Refills: 0         CONTINUE these medications which have NOT CHANGED    Details   propranolol LA (INDERAL LA) 120 mg SR capsule Take 120 mg by mouth nightly. Indications: hypertension      tiZANidine (ZANAFLEX) 4 mg tablet Take 4 mg by mouth every four (4) hours. Indications: MUSCLE SPASM      RABEprazole (ACIPHEX) 20 mg tablet Take 20 mg by mouth daily. Indications: HEARTBURN      promethazine (PHENERGAN) 25 mg tablet Take 25 mg by mouth every six (6) hours as needed for Nausea. amLODIPine (NORVASC) 10 mg tablet Take 1 Tab by mouth daily. Qty: 30 Tab, Refills: 0      levothyroxine (SYNTHROID) 100 mcg tablet Take 200 mcg by mouth nightly. Indications: hypothyroidism      temazepam (RESTORIL) 30 mg capsule Take 30 mg by mouth nightly.  Indications: INSOMNIA         STOP taking these medications       oxyCODONE-acetaminophen (PERCOCET)  mg per tablet Comments:   Reason for Stopping:               Activity: as tolerated  Diet: cardiac  Wound Care: none    Follow-up  ·   Dr. Alayna Desouza in 2 weeks  · Pain mgt as scheduled  Time spent to discharge patient greater than 30 minutes  Signed:  Shruthi Valentino MD  3/4/2017  12:21 PM

## 2017-03-04 NOTE — PROGRESS NOTES
Percocet 20 mg po for c/o pain at 8. Not very talkative. Depressed. Says he is upset that he is being sent home and no one knows what is wrong with him. There is no changes to his lower extremities -still have nicky amount of petechiae and bruises from upper thighs to ankles.

## 2017-03-04 NOTE — PROGRESS NOTES
HOB elevated, no acute distress, wife at bedside, neurovascular checks WNL, pain level 5, pale, LLES  Petechiae, ecchymosis behind both knees, papules raised with pale eschar, no drainage, states \"itches sometimes,\" LLE edema, pitting +2, tender to touch, no complaints of calf pain.

## 2017-03-04 NOTE — PROGRESS NOTES
Elevated blood pressure 195/116, given Apresoline 10 mg IV as ordered, physician aware also requesting pain medication, given Dilaudid 0.5 mg IV immediate relief before medication completely pushed, wife remains at bedside.

## 2017-03-04 NOTE — PROGRESS NOTES
Assessment complete. Patient resting in bed with wife at bedside. BLE elevated on Pillows, states he has pain and swelling in his posterior legs that disables him from being able to walk. Rash, petechiae, and ecchymosis noted to BLE. Pulses +2 in BLE. MOvement and sensation present, reports tenderness to the touch. Continues to be very specific with times he would like his pain medication. Voiding kalen colored urine in urinal. Patient states he does not feel like he is ready to go home tomorrow and wonders if he will even be able to walk due to the amount of pain he is experiencing in his legs.

## 2017-03-05 ENCOUNTER — HOME HEALTH ADMISSION (OUTPATIENT)
Dept: HOME HEALTH SERVICES | Facility: HOME HEALTH | Age: 47
End: 2017-03-05

## 2017-03-05 LAB
BACTERIA SPEC CULT: NORMAL
BACTERIA SPEC CULT: NORMAL
LUPUS ANTICOAG PANEL, XMLUPT: NORMAL
SERVICE CMNT-IMP: NORMAL
SERVICE CMNT-IMP: NORMAL

## 2017-03-06 NOTE — PROGRESS NOTES
SW received call from both [facility] and [facility] stating that they are unable to provide pt's iron infusion at home. LAUREL discussed with Dr. Negrito Mukherjee who request SW call Dr. Mery Waldron and arrange for pt to receive iron infusion in his office. LAUREL called and spoke with Belle Dodge at MD office who discussed with Dr. Mery Waldron and directed to have pt receive iron infusion at the [facility]. LAUREL called and spoke with Clair Nyhan at the [address] [place] [address]. Pt has appt on Wed March 8th at [address] [place] [address] called and left VM message on pt's spouse cell # [phone].

## 2017-03-07 NOTE — PROGRESS NOTES
3-7-17   SW received call back from pt's spouse. SW provided info regarding inability for pt to receive iron infusion at home. SW gave appt time and location at the [facility]. Spouse unsure pt can keep that appt, but agrees to contact Dr. Pau Cornelius & discuss with him. Per spouse, pt has an appt with Dr. Pau Cornelius on [facility] March 15th. LAUREL again called and left VM message on spouse cell # [phone]. LAUREL also called other phone # [phone],  given to SW by Dr. Rolanda Zhou ( Pain Specialist ) office. No answer or or answering machine at  [phone].

## 2017-03-07 NOTE — PROGRESS NOTES
LAUREL spoke with Ashley at [facility] and faxed script ( [phone]) to her for pt's iron infusion on Wed March 8th at 4pm.

## 2022-03-18 PROBLEM — T14.8XXA BRUISING: Status: ACTIVE | Noted: 2017-02-27

## 2022-03-18 PROBLEM — R23.3 PETECHIAL RASH: Status: ACTIVE | Noted: 2017-02-27

## 2022-03-19 PROBLEM — D64.9 SYMPTOMATIC ANEMIA: Status: ACTIVE | Noted: 2017-02-27

## 2023-05-14 PROCEDURE — 99285 EMERGENCY DEPT VISIT HI MDM: CPT

## 2023-05-15 ENCOUNTER — HOSPITAL ENCOUNTER (INPATIENT)
Age: 53
LOS: 2 days | Discharge: HOME | DRG: 392 | End: 2023-05-17
Attending: STUDENT IN AN ORGANIZED HEALTH CARE EDUCATION/TRAINING PROGRAM | Admitting: HOSPITALIST
Payer: COMMERCIAL

## 2023-05-15 ENCOUNTER — APPOINTMENT (OUTPATIENT)
Dept: GENERAL RADIOLOGY | Age: 53
DRG: 392 | End: 2023-05-15
Payer: COMMERCIAL

## 2023-05-15 ENCOUNTER — APPOINTMENT (OUTPATIENT)
Dept: CT IMAGING | Age: 53
DRG: 392 | End: 2023-05-15
Payer: COMMERCIAL

## 2023-05-15 DIAGNOSIS — R65.20 SEVERE SEPSIS (HCC): ICD-10-CM

## 2023-05-15 DIAGNOSIS — K52.9 COLITIS: Primary | ICD-10-CM

## 2023-05-15 DIAGNOSIS — A41.9 SEVERE SEPSIS (HCC): ICD-10-CM

## 2023-05-15 LAB
ALBUMIN SERPL-MCNC: 4.6 G/DL (ref 3.5–5)
ALBUMIN/GLOB SERPL: 1 (ref 0.4–1.6)
ALP SERPL-CCNC: 117 U/L (ref 50–136)
ALT SERPL-CCNC: 22 U/L (ref 12–65)
ANION GAP SERPL CALC-SCNC: 8 MMOL/L (ref 2–11)
APPEARANCE UR: CLEAR
AST SERPL-CCNC: 29 U/L (ref 15–37)
BASOPHILS # BLD: 0.1 K/UL (ref 0–0.2)
BASOPHILS NFR BLD: 0 % (ref 0–2)
BILIRUB SERPL-MCNC: 0.4 MG/DL (ref 0.2–1.1)
BILIRUB UR QL: NEGATIVE
BUN SERPL-MCNC: 28 MG/DL (ref 6–23)
CALCIUM SERPL-MCNC: 9.8 MG/DL (ref 8.3–10.4)
CHLORIDE SERPL-SCNC: 110 MMOL/L (ref 101–110)
CO2 SERPL-SCNC: 19 MMOL/L (ref 21–32)
COLOR UR: ABNORMAL
CREAT SERPL-MCNC: 0.97 MG/DL (ref 0.8–1.5)
DIFFERENTIAL METHOD BLD: ABNORMAL
EOSINOPHIL # BLD: 0.3 K/UL (ref 0–0.8)
EOSINOPHIL NFR BLD: 1 % (ref 0.5–7.8)
ERYTHROCYTE [DISTWIDTH] IN BLOOD BY AUTOMATED COUNT: 15.4 % (ref 11.9–14.6)
GLOBULIN SER CALC-MCNC: 4.5 G/DL (ref 2.8–4.5)
GLUCOSE SERPL-MCNC: 148 MG/DL (ref 65–100)
GLUCOSE UR STRIP.AUTO-MCNC: NEGATIVE MG/DL
HCT VFR BLD AUTO: 44.5 % (ref 41.1–50.3)
HGB BLD-MCNC: 14.6 G/DL (ref 13.6–17.2)
HGB UR QL STRIP: NEGATIVE
IMM GRANULOCYTES # BLD AUTO: 0.1 K/UL (ref 0–0.5)
IMM GRANULOCYTES NFR BLD AUTO: 0 % (ref 0–5)
KETONES UR QL STRIP.AUTO: ABNORMAL MG/DL
LACTATE SERPL-SCNC: 1.3 MMOL/L (ref 0.4–2)
LACTATE SERPL-SCNC: 1.6 MMOL/L (ref 0.4–2)
LACTATE SERPL-SCNC: 2.2 MMOL/L (ref 0.4–2)
LACTATE SERPL-SCNC: 2.6 MMOL/L (ref 0.4–2)
LEUKOCYTE ESTERASE UR QL STRIP.AUTO: NEGATIVE
LIPASE SERPL-CCNC: 119 U/L (ref 73–393)
LYMPHOCYTES # BLD: 2.2 K/UL (ref 0.5–4.6)
LYMPHOCYTES NFR BLD: 7 % (ref 13–44)
MAGNESIUM SERPL-MCNC: 2.3 MG/DL (ref 1.8–2.4)
MCH RBC QN AUTO: 30.2 PG (ref 26.1–32.9)
MCHC RBC AUTO-ENTMCNC: 32.8 G/DL (ref 31.4–35)
MCV RBC AUTO: 91.9 FL (ref 82–102)
MONOCYTES # BLD: 1.4 K/UL (ref 0.1–1.3)
MONOCYTES NFR BLD: 5 % (ref 4–12)
NEUTS SEG # BLD: 25.6 K/UL (ref 1.7–8.2)
NEUTS SEG NFR BLD: 86 % (ref 43–78)
NITRITE UR QL STRIP.AUTO: NEGATIVE
NRBC # BLD: 0 K/UL (ref 0–0.2)
PH UR STRIP: 5 (ref 5–9)
PLATELET # BLD AUTO: 404 K/UL (ref 150–450)
PMV BLD AUTO: 10.3 FL (ref 9.4–12.3)
POTASSIUM SERPL-SCNC: 4.4 MMOL/L (ref 3.5–5.1)
PROCALCITONIN SERPL-MCNC: 0.1 NG/ML (ref 0–0.49)
PROT SERPL-MCNC: 9.1 G/DL (ref 6.3–8.2)
PROT UR STRIP-MCNC: ABNORMAL MG/DL
RBC # BLD AUTO: 4.84 M/UL (ref 4.23–5.6)
SODIUM SERPL-SCNC: 137 MMOL/L (ref 133–143)
SP GR UR REFRACTOMETRY: >1.035 (ref 1–1.02)
TROPONIN I SERPL HS-MCNC: 5.3 PG/ML (ref 0–14)
UROBILINOGEN UR QL STRIP.AUTO: 1 EU/DL (ref 0.2–1)
WBC # BLD AUTO: 29.7 K/UL (ref 4.3–11.1)

## 2023-05-15 PROCEDURE — 36415 COLL VENOUS BLD VENIPUNCTURE: CPT

## 2023-05-15 PROCEDURE — 83605 ASSAY OF LACTIC ACID: CPT

## 2023-05-15 PROCEDURE — 85025 COMPLETE CBC W/AUTO DIFF WBC: CPT

## 2023-05-15 PROCEDURE — 84484 ASSAY OF TROPONIN QUANT: CPT

## 2023-05-15 PROCEDURE — 6360000002 HC RX W HCPCS: Performed by: STUDENT IN AN ORGANIZED HEALTH CARE EDUCATION/TRAINING PROGRAM

## 2023-05-15 PROCEDURE — 87040 BLOOD CULTURE FOR BACTERIA: CPT

## 2023-05-15 PROCEDURE — 2500000003 HC RX 250 WO HCPCS: Performed by: HOSPITALIST

## 2023-05-15 PROCEDURE — 83690 ASSAY OF LIPASE: CPT

## 2023-05-15 PROCEDURE — 96375 TX/PRO/DX INJ NEW DRUG ADDON: CPT

## 2023-05-15 PROCEDURE — 99221 1ST HOSP IP/OBS SF/LOW 40: CPT | Performed by: NURSE PRACTITIONER

## 2023-05-15 PROCEDURE — 71045 X-RAY EXAM CHEST 1 VIEW: CPT

## 2023-05-15 PROCEDURE — 84145 PROCALCITONIN (PCT): CPT

## 2023-05-15 PROCEDURE — 87449 NOS EACH ORGANISM AG IA: CPT

## 2023-05-15 PROCEDURE — 96365 THER/PROPH/DIAG IV INF INIT: CPT

## 2023-05-15 PROCEDURE — 6360000002 HC RX W HCPCS: Performed by: NURSE PRACTITIONER

## 2023-05-15 PROCEDURE — 81001 URINALYSIS AUTO W/SCOPE: CPT

## 2023-05-15 PROCEDURE — 74177 CT ABD & PELVIS W/CONTRAST: CPT

## 2023-05-15 PROCEDURE — 6360000002 HC RX W HCPCS: Performed by: HOSPITALIST

## 2023-05-15 PROCEDURE — 80053 COMPREHEN METABOLIC PANEL: CPT

## 2023-05-15 PROCEDURE — 83735 ASSAY OF MAGNESIUM: CPT

## 2023-05-15 PROCEDURE — 6370000000 HC RX 637 (ALT 250 FOR IP): Performed by: INTERNAL MEDICINE

## 2023-05-15 PROCEDURE — 6360000004 HC RX CONTRAST MEDICATION: Performed by: STUDENT IN AN ORGANIZED HEALTH CARE EDUCATION/TRAINING PROGRAM

## 2023-05-15 PROCEDURE — 87324 CLOSTRIDIUM AG IA: CPT

## 2023-05-15 PROCEDURE — 6370000000 HC RX 637 (ALT 250 FOR IP): Performed by: NURSE PRACTITIONER

## 2023-05-15 PROCEDURE — 1100000000 HC RM PRIVATE

## 2023-05-15 PROCEDURE — 96376 TX/PRO/DX INJ SAME DRUG ADON: CPT

## 2023-05-15 PROCEDURE — 2580000003 HC RX 258: Performed by: STUDENT IN AN ORGANIZED HEALTH CARE EDUCATION/TRAINING PROGRAM

## 2023-05-15 PROCEDURE — 2580000003 HC RX 258: Performed by: HOSPITALIST

## 2023-05-15 PROCEDURE — 94762 N-INVAS EAR/PLS OXIMTRY CONT: CPT

## 2023-05-15 RX ORDER — RABEPRAZOLE SODIUM 20 MG/1
20 TABLET, DELAYED RELEASE ORAL DAILY
COMMUNITY

## 2023-05-15 RX ORDER — BISACODYL 10 MG
10 SUPPOSITORY, RECTAL RECTAL DAILY
Status: DISCONTINUED | OUTPATIENT
Start: 2023-05-15 | End: 2023-05-17 | Stop reason: HOSPADM

## 2023-05-15 RX ORDER — ACETAMINOPHEN 325 MG/1
650 TABLET ORAL EVERY 6 HOURS PRN
Status: DISCONTINUED | OUTPATIENT
Start: 2023-05-15 | End: 2023-05-17 | Stop reason: HOSPADM

## 2023-05-15 RX ORDER — PROMETHAZINE HYDROCHLORIDE 25 MG/1
25 TABLET ORAL DAILY
COMMUNITY

## 2023-05-15 RX ORDER — ONDANSETRON 2 MG/ML
4 INJECTION INTRAMUSCULAR; INTRAVENOUS ONCE
Status: COMPLETED | OUTPATIENT
Start: 2023-05-15 | End: 2023-05-15

## 2023-05-15 RX ORDER — POLYETHYLENE GLYCOL 3350 17 G/17G
17 POWDER, FOR SOLUTION ORAL 2 TIMES DAILY
Status: DISCONTINUED | OUTPATIENT
Start: 2023-05-15 | End: 2023-05-16 | Stop reason: SDUPTHER

## 2023-05-15 RX ORDER — ONDANSETRON 4 MG/1
4 TABLET, ORALLY DISINTEGRATING ORAL EVERY 8 HOURS PRN
Status: DISCONTINUED | OUTPATIENT
Start: 2023-05-15 | End: 2023-05-17 | Stop reason: HOSPADM

## 2023-05-15 RX ORDER — LORAZEPAM 2 MG/ML
1 INJECTION INTRAMUSCULAR EVERY 6 HOURS PRN
Status: DISCONTINUED | OUTPATIENT
Start: 2023-05-15 | End: 2023-05-17 | Stop reason: HOSPADM

## 2023-05-15 RX ORDER — HYDRALAZINE HYDROCHLORIDE 20 MG/ML
10 INJECTION INTRAMUSCULAR; INTRAVENOUS EVERY 4 HOURS PRN
Status: DISCONTINUED | OUTPATIENT
Start: 2023-05-15 | End: 2023-05-17 | Stop reason: HOSPADM

## 2023-05-15 RX ORDER — HYDRALAZINE HYDROCHLORIDE 20 MG/ML
10 INJECTION INTRAMUSCULAR; INTRAVENOUS EVERY 4 HOURS PRN
Status: DISCONTINUED | OUTPATIENT
Start: 2023-05-15 | End: 2023-05-15

## 2023-05-15 RX ORDER — ONDANSETRON 2 MG/ML
8 INJECTION INTRAMUSCULAR; INTRAVENOUS
Status: COMPLETED | OUTPATIENT
Start: 2023-05-15 | End: 2023-05-15

## 2023-05-15 RX ORDER — SODIUM CHLORIDE 9 MG/ML
INJECTION, SOLUTION INTRAVENOUS PRN
Status: DISCONTINUED | OUTPATIENT
Start: 2023-05-15 | End: 2023-05-17 | Stop reason: HOSPADM

## 2023-05-15 RX ORDER — AMLODIPINE BESYLATE 10 MG/1
10 TABLET ORAL DAILY
COMMUNITY

## 2023-05-15 RX ORDER — LEVOTHYROXINE SODIUM 0.2 MG/1
200 TABLET ORAL DAILY
COMMUNITY

## 2023-05-15 RX ORDER — 0.9 % SODIUM CHLORIDE 0.9 %
1000 INTRAVENOUS SOLUTION INTRAVENOUS
Status: COMPLETED | OUTPATIENT
Start: 2023-05-15 | End: 2023-05-15

## 2023-05-15 RX ORDER — LABETALOL HYDROCHLORIDE 5 MG/ML
10 INJECTION, SOLUTION INTRAVENOUS EVERY 6 HOURS PRN
Status: DISCONTINUED | OUTPATIENT
Start: 2023-05-15 | End: 2023-05-15

## 2023-05-15 RX ORDER — ONDANSETRON 2 MG/ML
4 INJECTION INTRAMUSCULAR; INTRAVENOUS EVERY 6 HOURS PRN
Status: DISCONTINUED | OUTPATIENT
Start: 2023-05-15 | End: 2023-05-17 | Stop reason: HOSPADM

## 2023-05-15 RX ORDER — HYDROMORPHONE HYDROCHLORIDE 1 MG/ML
1 INJECTION, SOLUTION INTRAMUSCULAR; INTRAVENOUS; SUBCUTANEOUS
Status: DISCONTINUED | OUTPATIENT
Start: 2023-05-15 | End: 2023-05-17 | Stop reason: HOSPADM

## 2023-05-15 RX ORDER — 0.9 % SODIUM CHLORIDE 0.9 %
100 INTRAVENOUS SOLUTION INTRAVENOUS
Status: COMPLETED | OUTPATIENT
Start: 2023-05-15 | End: 2023-05-15

## 2023-05-15 RX ORDER — SODIUM CHLORIDE 0.9 % (FLUSH) 0.9 %
5-40 SYRINGE (ML) INJECTION PRN
Status: DISCONTINUED | OUTPATIENT
Start: 2023-05-15 | End: 2023-05-17 | Stop reason: HOSPADM

## 2023-05-15 RX ORDER — TEMAZEPAM 15 MG/1
30 CAPSULE ORAL NIGHTLY
COMMUNITY

## 2023-05-15 RX ORDER — OXYCODONE HYDROCHLORIDE 5 MG/1
20 CAPSULE ORAL 4 TIMES DAILY
COMMUNITY

## 2023-05-15 RX ORDER — POLYETHYLENE GLYCOL 3350 17 G/17G
17 POWDER, FOR SOLUTION ORAL DAILY
Status: DISCONTINUED | OUTPATIENT
Start: 2023-05-15 | End: 2023-05-15

## 2023-05-15 RX ORDER — SODIUM CHLORIDE 0.9 % (FLUSH) 0.9 %
5-40 SYRINGE (ML) INJECTION EVERY 12 HOURS SCHEDULED
Status: DISCONTINUED | OUTPATIENT
Start: 2023-05-15 | End: 2023-05-17 | Stop reason: HOSPADM

## 2023-05-15 RX ORDER — SODIUM CHLORIDE 9 MG/ML
INJECTION, SOLUTION INTRAVENOUS CONTINUOUS
Status: DISCONTINUED | OUTPATIENT
Start: 2023-05-15 | End: 2023-05-17 | Stop reason: HOSPADM

## 2023-05-15 RX ORDER — POLYETHYLENE GLYCOL 3350 17 G/17G
17 POWDER, FOR SOLUTION ORAL DAILY PRN
Status: DISCONTINUED | OUTPATIENT
Start: 2023-05-15 | End: 2023-05-15

## 2023-05-15 RX ORDER — LORAZEPAM 2 MG/ML
2 INJECTION INTRAMUSCULAR ONCE
Status: COMPLETED | OUTPATIENT
Start: 2023-05-15 | End: 2023-05-15

## 2023-05-15 RX ORDER — MORPHINE SULFATE 4 MG/ML
4 INJECTION INTRAVENOUS
Status: DISCONTINUED | OUTPATIENT
Start: 2023-05-15 | End: 2023-05-15

## 2023-05-15 RX ORDER — 0.9 % SODIUM CHLORIDE 0.9 %
1000 INTRAVENOUS SOLUTION INTRAVENOUS ONCE
Status: DISCONTINUED | OUTPATIENT
Start: 2023-05-15 | End: 2023-05-17 | Stop reason: HOSPADM

## 2023-05-15 RX ORDER — ACETAMINOPHEN 650 MG/1
650 SUPPOSITORY RECTAL EVERY 6 HOURS PRN
Status: DISCONTINUED | OUTPATIENT
Start: 2023-05-15 | End: 2023-05-17 | Stop reason: HOSPADM

## 2023-05-15 RX ORDER — SODIUM CHLORIDE 0.9 % (FLUSH) 0.9 %
10 SYRINGE (ML) INJECTION
Status: COMPLETED | OUTPATIENT
Start: 2023-05-15 | End: 2023-05-15

## 2023-05-15 RX ORDER — BISACODYL 10 MG
10 SUPPOSITORY, RECTAL RECTAL DAILY PRN
Status: DISCONTINUED | OUTPATIENT
Start: 2023-05-15 | End: 2023-05-15

## 2023-05-15 RX ORDER — LABETALOL HYDROCHLORIDE 5 MG/ML
10 INJECTION, SOLUTION INTRAVENOUS EVERY 6 HOURS PRN
Status: DISCONTINUED | OUTPATIENT
Start: 2023-05-15 | End: 2023-05-17 | Stop reason: HOSPADM

## 2023-05-15 RX ADMIN — SODIUM CHLORIDE: 9 INJECTION, SOLUTION INTRAVENOUS at 05:26

## 2023-05-15 RX ADMIN — PIPERACILLIN AND TAZOBACTAM 3375 MG: 3; .375 INJECTION, POWDER, LYOPHILIZED, FOR SOLUTION INTRAVENOUS at 23:54

## 2023-05-15 RX ADMIN — SODIUM CHLORIDE 1000 ML: 9 INJECTION, SOLUTION INTRAVENOUS at 04:10

## 2023-05-15 RX ADMIN — FENTANYL CITRATE 50 MCG: 50 INJECTION INTRAMUSCULAR; INTRAVENOUS at 03:29

## 2023-05-15 RX ADMIN — ONDANSETRON 4 MG: 2 INJECTION INTRAMUSCULAR; INTRAVENOUS at 01:33

## 2023-05-15 RX ADMIN — LORAZEPAM 1 MG: 2 INJECTION, SOLUTION INTRAMUSCULAR; INTRAVENOUS at 05:35

## 2023-05-15 RX ADMIN — LORAZEPAM 1 MG: 2 INJECTION, SOLUTION INTRAMUSCULAR; INTRAVENOUS at 21:40

## 2023-05-15 RX ADMIN — ONDANSETRON 8 MG: 2 INJECTION INTRAMUSCULAR; INTRAVENOUS at 04:09

## 2023-05-15 RX ADMIN — HYDROMORPHONE HYDROCHLORIDE 1 MG: 1 INJECTION, SOLUTION INTRAMUSCULAR; INTRAVENOUS; SUBCUTANEOUS at 17:04

## 2023-05-15 RX ADMIN — HYDROMORPHONE HYDROCHLORIDE 0.5 MG: 1 INJECTION, SOLUTION INTRAMUSCULAR; INTRAVENOUS; SUBCUTANEOUS at 05:54

## 2023-05-15 RX ADMIN — HYDROMORPHONE HYDROCHLORIDE 1 MG: 1 INJECTION, SOLUTION INTRAMUSCULAR; INTRAVENOUS; SUBCUTANEOUS at 11:40

## 2023-05-15 RX ADMIN — HYDROMORPHONE HYDROCHLORIDE 1 MG: 1 INJECTION, SOLUTION INTRAMUSCULAR; INTRAVENOUS; SUBCUTANEOUS at 23:22

## 2023-05-15 RX ADMIN — SODIUM CHLORIDE, PRESERVATIVE FREE 10 ML: 5 INJECTION INTRAVENOUS at 09:06

## 2023-05-15 RX ADMIN — HYDROMORPHONE HYDROCHLORIDE 1 MG: 1 INJECTION, SOLUTION INTRAMUSCULAR; INTRAVENOUS; SUBCUTANEOUS at 02:29

## 2023-05-15 RX ADMIN — HYDROMORPHONE HYDROCHLORIDE 1 MG: 1 INJECTION, SOLUTION INTRAMUSCULAR; INTRAVENOUS; SUBCUTANEOUS at 09:04

## 2023-05-15 RX ADMIN — PIPERACILLIN AND TAZOBACTAM 3375 MG: 3; .375 INJECTION, POWDER, LYOPHILIZED, FOR SOLUTION INTRAVENOUS at 17:07

## 2023-05-15 RX ADMIN — SODIUM CHLORIDE 100 ML: 9 INJECTION, SOLUTION INTRAVENOUS at 02:37

## 2023-05-15 RX ADMIN — SODIUM CHLORIDE: 9 INJECTION, SOLUTION INTRAVENOUS at 23:22

## 2023-05-15 RX ADMIN — PIPERACILLIN AND TAZOBACTAM 4500 MG: 4; .5 INJECTION, POWDER, LYOPHILIZED, FOR SOLUTION INTRAVENOUS at 02:52

## 2023-05-15 RX ADMIN — SODIUM CHLORIDE, PRESERVATIVE FREE 10 ML: 5 INJECTION INTRAVENOUS at 20:15

## 2023-05-15 RX ADMIN — HYDROMORPHONE HYDROCHLORIDE 1 MG: 1 INJECTION, SOLUTION INTRAMUSCULAR; INTRAVENOUS; SUBCUTANEOUS at 04:36

## 2023-05-15 RX ADMIN — HYDROMORPHONE HYDROCHLORIDE 1 MG: 1 INJECTION, SOLUTION INTRAMUSCULAR; INTRAVENOUS; SUBCUTANEOUS at 01:36

## 2023-05-15 RX ADMIN — LABETALOL HYDROCHLORIDE 10 MG: 5 INJECTION INTRAVENOUS at 12:41

## 2023-05-15 RX ADMIN — SODIUM CHLORIDE, PRESERVATIVE FREE 10 ML: 5 INJECTION INTRAVENOUS at 02:37

## 2023-05-15 RX ADMIN — PIPERACILLIN AND TAZOBACTAM 3375 MG: 3; .375 INJECTION, POWDER, LYOPHILIZED, FOR SOLUTION INTRAVENOUS at 09:05

## 2023-05-15 RX ADMIN — BISACODYL 10 MG: 10 SUPPOSITORY RECTAL at 09:05

## 2023-05-15 RX ADMIN — LABETALOL HYDROCHLORIDE 10 MG: 5 INJECTION INTRAVENOUS at 05:30

## 2023-05-15 RX ADMIN — LORAZEPAM 2 MG: 2 INJECTION, SOLUTION INTRAMUSCULAR; INTRAVENOUS at 18:05

## 2023-05-15 RX ADMIN — LORAZEPAM 1 MG: 2 INJECTION, SOLUTION INTRAMUSCULAR; INTRAVENOUS at 10:39

## 2023-05-15 RX ADMIN — HYDROMORPHONE HYDROCHLORIDE 1 MG: 1 INJECTION, SOLUTION INTRAMUSCULAR; INTRAVENOUS; SUBCUTANEOUS at 14:20

## 2023-05-15 RX ADMIN — SODIUM CHLORIDE, PRESERVATIVE FREE 10 ML: 5 INJECTION INTRAVENOUS at 05:34

## 2023-05-15 RX ADMIN — HYDROMORPHONE HYDROCHLORIDE 1 MG: 1 INJECTION, SOLUTION INTRAMUSCULAR; INTRAVENOUS; SUBCUTANEOUS at 20:18

## 2023-05-15 RX ADMIN — IOPAMIDOL 100 ML: 755 INJECTION, SOLUTION INTRAVENOUS at 02:37

## 2023-05-15 RX ADMIN — LORAZEPAM 1 MG: 2 INJECTION, SOLUTION INTRAMUSCULAR; INTRAVENOUS at 15:51

## 2023-05-15 RX ADMIN — POLYETHYLENE GLYCOL 3350 17 G: 17 POWDER, FOR SOLUTION ORAL at 20:15

## 2023-05-15 ASSESSMENT — PAIN SCALES - GENERAL
PAINLEVEL_OUTOF10: 10
PAINLEVEL_OUTOF10: 10
PAINLEVEL_OUTOF10: 8
PAINLEVEL_OUTOF10: 10
PAINLEVEL_OUTOF10: 7
PAINLEVEL_OUTOF10: 8
PAINLEVEL_OUTOF10: 10
PAINLEVEL_OUTOF10: 6
PAINLEVEL_OUTOF10: 6
PAINLEVEL_OUTOF10: 9
PAINLEVEL_OUTOF10: 10
PAINLEVEL_OUTOF10: 5
PAINLEVEL_OUTOF10: 9

## 2023-05-15 ASSESSMENT — PAIN DESCRIPTION - LOCATION
LOCATION: ABDOMEN
LOCATION: ABDOMEN;BACK
LOCATION: ABDOMEN
LOCATION: GENERALIZED
LOCATION: ABDOMEN

## 2023-05-15 ASSESSMENT — PAIN DESCRIPTION - DESCRIPTORS
DESCRIPTORS: ACHING
DESCRIPTORS: DISCOMFORT
DESCRIPTORS: ACHING

## 2023-05-15 ASSESSMENT — PAIN - FUNCTIONAL ASSESSMENT: PAIN_FUNCTIONAL_ASSESSMENT: 0-10

## 2023-05-15 ASSESSMENT — ENCOUNTER SYMPTOMS
ABDOMINAL PAIN: 1
VOMITING: 1
ABDOMINAL DISTENTION: 1
NAUSEA: 1
CONSTIPATION: 1

## 2023-05-15 ASSESSMENT — PAIN DESCRIPTION - ORIENTATION: ORIENTATION: ANTERIOR;UPPER

## 2023-05-15 NOTE — H&P
Hospitalist History and Physical   Admit Date:  5/15/2023  1:06 AM   Name:  Mike Astorga   Age:  47 y.o. Sex:  male  :  1970   MRN:  [id number]   Room:      Presenting/Chief Complaint: Abdominal Pain     Reason(s) for Admission: Colitis [K52.9]  Acute colitis [K52.9]  Severe sepsis ([facility] [address] [A41.9, R65.20]     History of Present Illness:   47years old gentleman with history of ulcerative colitis when he was in his late 25s, no flares since that time, history of hypertension, diabetes type 2 presented to the emergency room with chief complaint of nausea, vomiting, abdominal pain on and off going on for past 4 to 5 days duration. Patient reports symptoms continue to get worse, frequency of vomitus getting worse, turning yellow. Patient reports he is constipated severely. Patient reports on pain medications chronically for chronic hip pain secondary to accident. Abdominal pain is lower quadrant. Evaluated in the emergency room, CT scan of abdomen was obtained shows evidence of colitis. Lab work shows evidence of elevated white count. Gastric consultation was also requested in ER requested admission. Denies any fever, chills but patient was in severe pain, requiring multiple dose of pain medications in the emergency room. Patient looks very anxious emergency room. Assessment & Plan:     Intractable nausea, vomiting, abdominal pain, likely secondary to colitis, initiated on antibiotics, provide pain medications. Initiated on IV fluids. Acute colitis: Initiated on antibiotics, patient has remote history of ulcerative colitis but currently not on any treatment. Poorly controlled hypertension: Likely secondary to pain, will provide antihypertensives IV. Hypothyroidism: Continue levothyroxine. PT/OT evals and PPD needed/ordered?   No  Diet: Diet NPO  VTE prophylaxis: SCD's   Code status: Full Code    Hospital Problems:  Principal Problem:    Acute colitis  Active

## 2023-05-15 NOTE — ED PROVIDER NOTES
Emergency Department Provider Note       PCP: No primary care provider on file. Age: 47 y.o. Sex: male     DISPOSITION Decision To Admit 05/15/2023 03:26:31 AM       ICD-10-CM    1. Colitis  K52.9       2. Severe sepsis ([facility] [address]  A41.9     R65.20           Medical Decision Making     Complexity of Problems Addressed:  1 or more acute illnesses that pose a threat to life or bodily function. Data Reviewed and Analyzed:  Category 1:   I independently ordered and reviewed each unique test.  I reviewed external records: ED visit note from an outside group. I reviewed external records: provider visit note from PCP. I reviewed external records: provider visit note from outside specialist.   The patients assessment required an independent historian: Spouse at bedside. The reason they were needed is important historical information not provided by the patient. Category 2:   I interpreted the CT Scan CT imaging shows diffusely distended colon, no evidence of perforation. Category 3: Discussion of management or test interpretation. 50-year-old male patient presenting with significant abdominal pain nausea vomiting and lack of bowel movement for the past several days. Some concern for obstructive pathology as a cause of symptoms. We will move forward with labs, pain control, antiemetics, fluid bolus and CT imaging. Patient reports severe needle phobia which is chronic. He states this is why he is so anxious on my initial evaluation. The patient was admitted and I have discussed patient management with the admitting provider. The management of this patient was discussed with an external consultant. Risk of Complications and/or Morbidity of Patient Management:  Parental controlled substances given in the ED. Drug therapy given requiring intensive monitoring for toxicity. Discussion with external consultants.   Shared medical decision making was utilized in creating the patients health plan

## 2023-05-15 NOTE — ED TRIAGE NOTES
Several days of abdominal pain that worsened significantly yesterday. Multiple episodes of vomiting, including in the waiting area. Hx of ulcerative colitis but this pain does not resemble past flares. No BM for 4-5 days despite reducing oxycodone dosing and taking a stool softener. Pain in bilateral testicles as well. Brief relief from pain after vomiting but then returns.

## 2023-05-15 NOTE — CONSULTS
Consult Note            Date:5/15/2023        Patient Yolande Bernal     YOB: 1970     Age:52 y.o. Inpatient consult to GI  Consult performed by: Edgard Adan MD  Consult ordered by: Jeremy El MD  Reason for consult: Constipation. Abnormal CT scan of bowel  Assessment/Recommendations: Bowel prep gently once stools are clear recommend full colonoscopic evaluation        Chief Complaint     Chief Complaint   Patient presents with    Abdominal Pain          History Obtained From   patient    History of Present Illness   Patient is a 79-year-old with remote history of ulcerative colitis and history of chronic pain on narcotic. Presents with generalized abdominal pain associated nausea vomiting. His last bowel movement was 4 days ago. He was seen in the emergency room. His white count was elevated. His CT scan showed a colon filled with fluid in the stool. No sign of mass lesion or thickening of the wall of the colon or pericolonic fat stranding. He was started on Colace and MiraLAX as well as antibiotics. He denied any rectal bleeding or fever or chills at home. His colonoscopic evaluation was more than 8 years ago. Past Medical History     Past Medical History:   Diagnosis Date    Crohn's disease ([facility] [address] 1990        Past Surgical History     Past Surgical History:   Procedure Laterality Date    ABDOMINAL WALL SURGERY N/A     Pyloric stenosis as a child at 7 months of age    [de-identified] PINNING Left     from ski accident    ORIF TIBIA & FIBULA FRACTURES Left     from ski accident        Medications     Prior to Admission medications    Medication Sig Start Date End Date Taking? Authorizing Provider   oxyCODONE 5 MG capsule Take 4 capsules by mouth in the morning, at noon, in the evening, and at bedtime.  Max Daily Amount: 80 mg   Yes Historical Provider, MD   promethazine (PHENERGAN) 25 MG tablet Take 1 tablet by mouth daily   Yes Historical Provider, MD   levothyroxine (SYNTHROID) 200
McBurney's sign. Hernia: No hernia is present. Comments: Overall the abdomen is soft to palpation with mild  distention. Reproducible discomfort with palpation of the periumbilical and left lower quadrant. No guarding or rebound present. Musculoskeletal:         General: No swelling, tenderness or deformity. Normal range of motion. Cervical back: Normal range of motion. Skin:     General: Skin is warm. Capillary Refill: Capillary refill takes less than 2 seconds. Neurological:      General: No focal deficit present. Mental Status: He is alert. Psychiatric:         Mood and Affect: Mood is anxious. Recent vitals (if inpt):  Patient Vitals for the past 24 hrs:   BP Temp Temp src Pulse Resp SpO2 Height Weight   05/15/23 0745 (!) 160/90 -- -- -- -- -- -- --   05/15/23 0731 (!) 157/140 98.2 °F (36.8 °C) Axillary 95 16 96 % -- --   05/15/23 0530 -- -- -- (!) 110 -- -- -- --   05/15/23 0513 (!) 199/102 99.1 °F (37.3 °C) -- (!) 113 24 96 % -- --   05/15/23 0430 (!) 171/108 -- -- 95 13 96 % -- --   05/15/23 0413 -- 98.6 °F (37 °C) -- -- -- -- -- --   05/15/23 0400 -- -- -- (!) 107 25 -- -- --   05/15/23 0330 (!) 184/102 -- -- -- -- -- -- --   05/15/23 0300 (!) 170/153 -- -- -- -- 97 % -- --   05/15/23 0226 (!) 163/107 -- -- -- -- 97 % -- --   05/15/23 0023 (!) 154/80 98.7 °F (37.1 °C) Oral -- 22 99 % 5' 8\" (1.727 m) 170 lb (77.1 kg)       Labs:  Recent Labs     05/15/23  0126   WBC 29.7*   HGB 14.6         K 4.4      CO2 19*   BUN 28*   ALT 22         I reviewed recent labs and recent radiologic studies. Admission date (for inpatients): 5/15/2023   * No surgery found *  * No surgery found *    ASSESSMENT:  [unfilled]  Principal Problem:    Acute colitis  Active Problems:    Chronic pain    Hypertension  Resolved Problems:    * No resolved hospital problems.  *       PLAN:    -Further input per attending surgeon, Dr. Yevgeniy Urbina  -Will add bowel regimen to include

## 2023-05-15 NOTE — ED NOTES
Pt c/o chest pain while going to ct , md notified, ekg done, trop drawn, NS bolus started     Maryjo Guzman RN  05/15/23 5164

## 2023-05-16 ENCOUNTER — PREP FOR PROCEDURE (OUTPATIENT)
Dept: GASTROENTEROLOGY | Age: 53
End: 2023-05-16

## 2023-05-16 PROBLEM — K59.00 CONSTIPATION: Status: ACTIVE | Noted: 2023-05-16

## 2023-05-16 LAB
ALBUMIN SERPL-MCNC: 4.1 G/DL (ref 3.5–5)
ALBUMIN/GLOB SERPL: 1.1 (ref 0.4–1.6)
ALP SERPL-CCNC: 85 U/L (ref 50–136)
ALT SERPL-CCNC: 26 U/L (ref 12–65)
ANION GAP SERPL CALC-SCNC: 5 MMOL/L (ref 2–11)
AST SERPL-CCNC: 47 U/L (ref 15–37)
BASOPHILS # BLD: 0.1 K/UL (ref 0–0.2)
BASOPHILS NFR BLD: 1 % (ref 0–2)
BILIRUB SERPL-MCNC: 0.7 MG/DL (ref 0.2–1.1)
BUN SERPL-MCNC: 13 MG/DL (ref 6–23)
C DIFF GDH STL QL: NORMAL
C DIFF TOX A+B STL QL IA: NORMAL
C DIFF TOXIN INTERPRETATION: NORMAL
CALCIUM SERPL-MCNC: 8.9 MG/DL (ref 8.3–10.4)
CHLORIDE SERPL-SCNC: 110 MMOL/L (ref 101–110)
CLINICAL CONSIDERATION: NORMAL
CO2 SERPL-SCNC: 24 MMOL/L (ref 21–32)
CREAT SERPL-MCNC: 0.77 MG/DL (ref 0.8–1.5)
DIFFERENTIAL METHOD BLD: ABNORMAL
EOSINOPHIL # BLD: 0.1 K/UL (ref 0–0.8)
EOSINOPHIL NFR BLD: 0 % (ref 0.5–7.8)
ERYTHROCYTE [DISTWIDTH] IN BLOOD BY AUTOMATED COUNT: 15.1 % (ref 11.9–14.6)
GLOBULIN SER CALC-MCNC: 3.9 G/DL (ref 2.8–4.5)
GLUCOSE SERPL-MCNC: 109 MG/DL (ref 65–100)
HCT VFR BLD AUTO: 39.9 % (ref 41.1–50.3)
HGB BLD-MCNC: 13.1 G/DL (ref 13.6–17.2)
IMM GRANULOCYTES # BLD AUTO: 0.1 K/UL (ref 0–0.5)
IMM GRANULOCYTES NFR BLD AUTO: 0 % (ref 0–5)
LYMPHOCYTES # BLD: 2.7 K/UL (ref 0.5–4.6)
LYMPHOCYTES NFR BLD: 20 % (ref 13–44)
MCH RBC QN AUTO: 29.5 PG (ref 26.1–32.9)
MCHC RBC AUTO-ENTMCNC: 32.8 G/DL (ref 31.4–35)
MCV RBC AUTO: 89.9 FL (ref 82–102)
MONOCYTES # BLD: 1.3 K/UL (ref 0.1–1.3)
MONOCYTES NFR BLD: 10 % (ref 4–12)
NEUTS SEG # BLD: 9.5 K/UL (ref 1.7–8.2)
NEUTS SEG NFR BLD: 69 % (ref 43–78)
NRBC # BLD: 0 K/UL (ref 0–0.2)
PLATELET # BLD AUTO: 333 K/UL (ref 150–450)
PMV BLD AUTO: 9.6 FL (ref 9.4–12.3)
POTASSIUM SERPL-SCNC: 3.6 MMOL/L (ref 3.5–5.1)
PROT SERPL-MCNC: 8 G/DL (ref 6.3–8.2)
RBC # BLD AUTO: 4.44 M/UL (ref 4.23–5.6)
REFLEX: NORMAL
SODIUM SERPL-SCNC: 139 MMOL/L (ref 133–143)
WBC # BLD AUTO: 13.7 K/UL (ref 4.3–11.1)

## 2023-05-16 PROCEDURE — 6370000000 HC RX 637 (ALT 250 FOR IP): Performed by: NURSE PRACTITIONER

## 2023-05-16 PROCEDURE — 2580000003 HC RX 258: Performed by: HOSPITALIST

## 2023-05-16 PROCEDURE — 36415 COLL VENOUS BLD VENIPUNCTURE: CPT

## 2023-05-16 PROCEDURE — 6360000002 HC RX W HCPCS: Performed by: NURSE PRACTITIONER

## 2023-05-16 PROCEDURE — 80053 COMPREHEN METABOLIC PANEL: CPT

## 2023-05-16 PROCEDURE — 1100000000 HC RM PRIVATE

## 2023-05-16 PROCEDURE — 6370000000 HC RX 637 (ALT 250 FOR IP): Performed by: INTERNAL MEDICINE

## 2023-05-16 PROCEDURE — 85025 COMPLETE CBC W/AUTO DIFF WBC: CPT

## 2023-05-16 PROCEDURE — 6360000002 HC RX W HCPCS: Performed by: HOSPITALIST

## 2023-05-16 PROCEDURE — 6370000000 HC RX 637 (ALT 250 FOR IP): Performed by: HOSPITALIST

## 2023-05-16 PROCEDURE — 99231 SBSQ HOSP IP/OBS SF/LOW 25: CPT | Performed by: NURSE PRACTITIONER

## 2023-05-16 RX ORDER — LABETALOL HYDROCHLORIDE 5 MG/ML
10 INJECTION, SOLUTION INTRAVENOUS EVERY 6 HOURS PRN
Status: CANCELLED | OUTPATIENT
Start: 2023-05-16

## 2023-05-16 RX ORDER — ACETAMINOPHEN 325 MG/1
650 TABLET ORAL EVERY 6 HOURS PRN
Status: CANCELLED | OUTPATIENT
Start: 2023-05-16

## 2023-05-16 RX ORDER — SODIUM CHLORIDE 9 MG/ML
INJECTION, SOLUTION INTRAVENOUS CONTINUOUS
Status: CANCELLED | OUTPATIENT
Start: 2023-05-16

## 2023-05-16 RX ORDER — DOCUSATE SODIUM 100 MG/1
100 CAPSULE, LIQUID FILLED ORAL 2 TIMES DAILY
Status: DISCONTINUED | OUTPATIENT
Start: 2023-05-16 | End: 2023-05-17 | Stop reason: HOSPADM

## 2023-05-16 RX ORDER — SODIUM CHLORIDE 9 MG/ML
INJECTION, SOLUTION INTRAVENOUS PRN
Status: CANCELLED | OUTPATIENT
Start: 2023-05-16

## 2023-05-16 RX ORDER — LEVOTHYROXINE SODIUM 0.1 MG/1
200 TABLET ORAL DAILY
Status: DISCONTINUED | OUTPATIENT
Start: 2023-05-16 | End: 2023-05-17 | Stop reason: HOSPADM

## 2023-05-16 RX ORDER — SODIUM CHLORIDE 9 MG/ML
25 INJECTION, SOLUTION INTRAVENOUS PRN
Status: CANCELLED | OUTPATIENT
Start: 2023-05-16

## 2023-05-16 RX ORDER — POLYETHYLENE GLYCOL 3350 17 G/17G
17 POWDER, FOR SOLUTION ORAL 2 TIMES DAILY
Status: CANCELLED | OUTPATIENT
Start: 2023-05-16 | Stop reason: SDUPTHER

## 2023-05-16 RX ORDER — ONDANSETRON 4 MG/1
4 TABLET, ORALLY DISINTEGRATING ORAL EVERY 8 HOURS PRN
Status: CANCELLED | OUTPATIENT
Start: 2023-05-16

## 2023-05-16 RX ORDER — SODIUM CHLORIDE 0.9 % (FLUSH) 0.9 %
5-40 SYRINGE (ML) INJECTION EVERY 12 HOURS SCHEDULED
Status: CANCELLED | OUTPATIENT
Start: 2023-05-16

## 2023-05-16 RX ORDER — DOCUSATE SODIUM 100 MG/1
100 CAPSULE, LIQUID FILLED ORAL 2 TIMES DAILY
Status: CANCELLED | OUTPATIENT
Start: 2023-05-16

## 2023-05-16 RX ORDER — LORAZEPAM 2 MG/ML
1 INJECTION INTRAMUSCULAR EVERY 6 HOURS PRN
Status: CANCELLED | OUTPATIENT
Start: 2023-05-16

## 2023-05-16 RX ORDER — LEVOTHYROXINE SODIUM 0.1 MG/1
200 TABLET ORAL DAILY
Status: CANCELLED | OUTPATIENT
Start: 2023-05-16

## 2023-05-16 RX ORDER — AMLODIPINE BESYLATE 10 MG/1
10 TABLET ORAL DAILY
Status: CANCELLED | OUTPATIENT
Start: 2023-05-16

## 2023-05-16 RX ORDER — BISACODYL 10 MG
10 SUPPOSITORY, RECTAL RECTAL DAILY
Status: CANCELLED | OUTPATIENT
Start: 2023-05-17

## 2023-05-16 RX ORDER — ACETAMINOPHEN 650 MG/1
650 SUPPOSITORY RECTAL EVERY 6 HOURS PRN
Status: CANCELLED | OUTPATIENT
Start: 2023-05-16

## 2023-05-16 RX ORDER — AMLODIPINE BESYLATE 10 MG/1
10 TABLET ORAL DAILY
Status: DISCONTINUED | OUTPATIENT
Start: 2023-05-16 | End: 2023-05-17 | Stop reason: HOSPADM

## 2023-05-16 RX ORDER — SODIUM CHLORIDE 0.9 % (FLUSH) 0.9 %
5-40 SYRINGE (ML) INJECTION PRN
Status: CANCELLED | OUTPATIENT
Start: 2023-05-16

## 2023-05-16 RX ORDER — ONDANSETRON 2 MG/ML
4 INJECTION INTRAMUSCULAR; INTRAVENOUS EVERY 6 HOURS PRN
Status: CANCELLED | OUTPATIENT
Start: 2023-05-16

## 2023-05-16 RX ORDER — HYDROMORPHONE HYDROCHLORIDE 1 MG/ML
1 INJECTION, SOLUTION INTRAMUSCULAR; INTRAVENOUS; SUBCUTANEOUS
Status: CANCELLED | OUTPATIENT
Start: 2023-05-16

## 2023-05-16 RX ORDER — LORAZEPAM 2 MG/ML
1 INJECTION INTRAMUSCULAR ONCE
Status: COMPLETED | OUTPATIENT
Start: 2023-05-16 | End: 2023-05-16

## 2023-05-16 RX ORDER — HYDRALAZINE HYDROCHLORIDE 20 MG/ML
10 INJECTION INTRAMUSCULAR; INTRAVENOUS EVERY 4 HOURS PRN
Status: CANCELLED | OUTPATIENT
Start: 2023-05-16

## 2023-05-16 RX ADMIN — LEVOTHYROXINE SODIUM 200 MCG: 0.1 TABLET ORAL at 16:45

## 2023-05-16 RX ADMIN — DOCUSATE SODIUM 100 MG: 100 CAPSULE, LIQUID FILLED ORAL at 08:54

## 2023-05-16 RX ADMIN — SODIUM CHLORIDE, PRESERVATIVE FREE 10 ML: 5 INJECTION INTRAVENOUS at 08:57

## 2023-05-16 RX ADMIN — POLYETHYLENE GLYCOL 3350 17 G: 17 POWDER, FOR SOLUTION ORAL at 08:56

## 2023-05-16 RX ADMIN — POLYETHYLENE GLYCOL-3350 AND ELECTROLYTES 4000 ML: 236; 6.74; 5.86; 2.97; 22.74 POWDER, FOR SOLUTION ORAL at 16:47

## 2023-05-16 RX ADMIN — PIPERACILLIN AND TAZOBACTAM 3375 MG: 3; .375 INJECTION, POWDER, LYOPHILIZED, FOR SOLUTION INTRAVENOUS at 08:57

## 2023-05-16 RX ADMIN — BISACODYL 10 MG: 10 SUPPOSITORY RECTAL at 08:54

## 2023-05-16 RX ADMIN — HYDROMORPHONE HYDROCHLORIDE 1 MG: 1 INJECTION, SOLUTION INTRAMUSCULAR; INTRAVENOUS; SUBCUTANEOUS at 23:41

## 2023-05-16 RX ADMIN — LORAZEPAM 1 MG: 2 INJECTION, SOLUTION INTRAMUSCULAR; INTRAVENOUS at 23:40

## 2023-05-16 RX ADMIN — SODIUM CHLORIDE: 9 INJECTION, SOLUTION INTRAVENOUS at 23:53

## 2023-05-16 RX ADMIN — LORAZEPAM 1 MG: 2 INJECTION, SOLUTION INTRAMUSCULAR; INTRAVENOUS at 10:17

## 2023-05-16 RX ADMIN — HYDROMORPHONE HYDROCHLORIDE 1 MG: 1 INJECTION, SOLUTION INTRAMUSCULAR; INTRAVENOUS; SUBCUTANEOUS at 20:44

## 2023-05-16 RX ADMIN — ONDANSETRON 4 MG: 2 INJECTION INTRAMUSCULAR; INTRAVENOUS at 19:33

## 2023-05-16 RX ADMIN — HYDROMORPHONE HYDROCHLORIDE 1 MG: 1 INJECTION, SOLUTION INTRAMUSCULAR; INTRAVENOUS; SUBCUTANEOUS at 02:15

## 2023-05-16 RX ADMIN — LORAZEPAM 1 MG: 2 INJECTION, SOLUTION INTRAMUSCULAR; INTRAVENOUS at 16:45

## 2023-05-16 RX ADMIN — SODIUM CHLORIDE, PRESERVATIVE FREE 10 ML: 5 INJECTION INTRAVENOUS at 19:34

## 2023-05-16 RX ADMIN — AMLODIPINE BESYLATE 10 MG: 10 TABLET ORAL at 16:45

## 2023-05-16 RX ADMIN — HYDROMORPHONE HYDROCHLORIDE 1 MG: 1 INJECTION, SOLUTION INTRAMUSCULAR; INTRAVENOUS; SUBCUTANEOUS at 05:18

## 2023-05-16 RX ADMIN — ACETAMINOPHEN 650 MG: 325 TABLET, FILM COATED ORAL at 09:09

## 2023-05-16 RX ADMIN — PIPERACILLIN AND TAZOBACTAM 3375 MG: 3; .375 INJECTION, POWDER, LYOPHILIZED, FOR SOLUTION INTRAVENOUS at 16:45

## 2023-05-16 RX ADMIN — HYDROMORPHONE HYDROCHLORIDE 1 MG: 1 INJECTION, SOLUTION INTRAMUSCULAR; INTRAVENOUS; SUBCUTANEOUS at 14:36

## 2023-05-16 RX ADMIN — HYDROMORPHONE HYDROCHLORIDE 1 MG: 1 INJECTION, SOLUTION INTRAMUSCULAR; INTRAVENOUS; SUBCUTANEOUS at 08:55

## 2023-05-16 RX ADMIN — HYDROMORPHONE HYDROCHLORIDE 1 MG: 1 INJECTION, SOLUTION INTRAMUSCULAR; INTRAVENOUS; SUBCUTANEOUS at 17:44

## 2023-05-16 RX ADMIN — LORAZEPAM 1 MG: 2 INJECTION, SOLUTION INTRAMUSCULAR; INTRAVENOUS at 04:07

## 2023-05-16 RX ADMIN — LORAZEPAM 1 MG: 2 INJECTION, SOLUTION INTRAMUSCULAR; INTRAVENOUS at 23:41

## 2023-05-16 RX ADMIN — HYDROMORPHONE HYDROCHLORIDE 1 MG: 1 INJECTION, SOLUTION INTRAMUSCULAR; INTRAVENOUS; SUBCUTANEOUS at 11:49

## 2023-05-16 ASSESSMENT — PAIN DESCRIPTION - DESCRIPTORS
DESCRIPTORS: BURNING;SHARP
DESCRIPTORS: ACHING
DESCRIPTORS: ACHING
DESCRIPTORS: ACHING;STABBING;THROBBING
DESCRIPTORS: ACHING;NUMBNESS;THROBBING
DESCRIPTORS: ACHING
DESCRIPTORS: ACHING

## 2023-05-16 ASSESSMENT — PAIN DESCRIPTION - ORIENTATION
ORIENTATION: MID;ANTERIOR
ORIENTATION: MID
ORIENTATION: MID
ORIENTATION: ANTERIOR;UPPER
ORIENTATION: MID

## 2023-05-16 ASSESSMENT — PAIN SCALES - GENERAL
PAINLEVEL_OUTOF10: 8
PAINLEVEL_OUTOF10: 10
PAINLEVEL_OUTOF10: 7
PAINLEVEL_OUTOF10: 8
PAINLEVEL_OUTOF10: 8
PAINLEVEL_OUTOF10: 5
PAINLEVEL_OUTOF10: 2
PAINLEVEL_OUTOF10: 9
PAINLEVEL_OUTOF10: 7
PAINLEVEL_OUTOF10: 5
PAINLEVEL_OUTOF10: 7

## 2023-05-16 ASSESSMENT — PAIN DESCRIPTION - LOCATION
LOCATION: GENERALIZED
LOCATION: HEAD
LOCATION: ABDOMEN
LOCATION: ABDOMEN
LOCATION: GENERALIZED
LOCATION: ABDOMEN

## 2023-05-16 NOTE — CARE COORDINATION
05/15/23 1430   Service Assessment   Patient Orientation Alert and Oriented;Person;Place; Self   Cognition Alert   History Provided By Patient;Spouse   Primary Caregiver Self   Support Systems Spouse/Significant Other   Patient's Healthcare Decision Maker is: Legal Next of Kin   PCP Verified by CM Yes   Last Visit to PCP Within last 6 months   Prior Functional Level Independent in ADLs/IADLs   Current Functional Level Independent in ADLs/IADLs   Can patient return to prior living arrangement Yes   Ability to make needs known: Good   Family able to assist with home care needs: Yes   Would you like for me to discuss the discharge plan with any other family members/significant others, and if so, who? Yes  (spouse)   Financial Resources Other (Comment)  (BCBS)   CM/SW Referral Other (see comment)  (d/c planning)   Social/Functional History   Lives With [address] [facility], [address] [place] [address] Help From Family   ADL Assistance Independent   Ambulation Assistance Independent   Transfer Assistance Independent   Active  Yes   Mode of Transportation Car   Discharge Planning   Type of Residence House   Living Arrangements Spouse/Significant Other   Current Services Prior To Admission Durable Medical Equipment   Current DME Prior to [employer] Needed N/A   DME Ordered? No   Potential Assistance Purchasing Medications No   Type of Home Care Services None   Patient expects to be discharged to: [address] Discharge   Transition of Care Consult (CM Consult) Discharge Planning   Services At/After Discharge None   Confirm Follow Up Transport Family   Condition of Participation: Discharge Planning   The Plan for Transition of Care is related to the following treatment goals: Home with spouse when medically stable     Interdisciplinary team meeting with attending, CM, nursing, and PT for plan of care CM met with patient and spouse for d/c planning.  Patient alert and oriented

## 2023-05-17 ENCOUNTER — ANESTHESIA (OUTPATIENT)
Dept: ENDOSCOPY | Age: 53
DRG: 392 | End: 2023-05-17
Payer: COMMERCIAL

## 2023-05-17 ENCOUNTER — ANESTHESIA EVENT (OUTPATIENT)
Dept: ENDOSCOPY | Age: 53
DRG: 392 | End: 2023-05-17
Payer: COMMERCIAL

## 2023-05-17 VITALS
SYSTOLIC BLOOD PRESSURE: 139 MMHG | DIASTOLIC BLOOD PRESSURE: 82 MMHG | HEART RATE: 77 BPM | OXYGEN SATURATION: 96 % | TEMPERATURE: 98 F | RESPIRATION RATE: 18 BRPM | WEIGHT: 170 LBS | HEIGHT: 68 IN | BODY MASS INDEX: 25.76 KG/M2

## 2023-05-17 LAB
ALBUMIN SERPL-MCNC: 3.7 G/DL (ref 3.5–5)
ALBUMIN/GLOB SERPL: 1.1 (ref 0.4–1.6)
ALP SERPL-CCNC: 68 U/L (ref 50–136)
ALT SERPL-CCNC: 32 U/L (ref 12–65)
ANION GAP SERPL CALC-SCNC: 9 MMOL/L (ref 2–11)
AST SERPL-CCNC: 74 U/L (ref 15–37)
BASOPHILS # BLD: 0.1 K/UL (ref 0–0.2)
BASOPHILS NFR BLD: 1 % (ref 0–2)
BILIRUB SERPL-MCNC: 0.6 MG/DL (ref 0.2–1.1)
BUN SERPL-MCNC: 13 MG/DL (ref 6–23)
CALCIUM SERPL-MCNC: 8.8 MG/DL (ref 8.3–10.4)
CHLORIDE SERPL-SCNC: 110 MMOL/L (ref 101–110)
CO2 SERPL-SCNC: 24 MMOL/L (ref 21–32)
CREAT SERPL-MCNC: 0.69 MG/DL (ref 0.8–1.5)
DIFFERENTIAL METHOD BLD: ABNORMAL
EOSINOPHIL # BLD: 0.1 K/UL (ref 0–0.8)
EOSINOPHIL NFR BLD: 1 % (ref 0.5–7.8)
ERYTHROCYTE [DISTWIDTH] IN BLOOD BY AUTOMATED COUNT: 15.1 % (ref 11.9–14.6)
GLOBULIN SER CALC-MCNC: 3.3 G/DL (ref 2.8–4.5)
GLUCOSE SERPL-MCNC: 88 MG/DL (ref 65–100)
HCT VFR BLD AUTO: 35.2 % (ref 41.1–50.3)
HGB BLD-MCNC: 11.8 G/DL (ref 13.6–17.2)
IMM GRANULOCYTES # BLD AUTO: 0 K/UL (ref 0–0.5)
IMM GRANULOCYTES NFR BLD AUTO: 0 % (ref 0–5)
LYMPHOCYTES # BLD: 2.3 K/UL (ref 0.5–4.6)
LYMPHOCYTES NFR BLD: 24 % (ref 13–44)
MCH RBC QN AUTO: 30.3 PG (ref 26.1–32.9)
MCHC RBC AUTO-ENTMCNC: 33.5 G/DL (ref 31.4–35)
MCV RBC AUTO: 90.5 FL (ref 82–102)
MONOCYTES # BLD: 1 K/UL (ref 0.1–1.3)
MONOCYTES NFR BLD: 10 % (ref 4–12)
NEUTS SEG # BLD: 6.1 K/UL (ref 1.7–8.2)
NEUTS SEG NFR BLD: 64 % (ref 43–78)
NRBC # BLD: 0 K/UL (ref 0–0.2)
PLATELET # BLD AUTO: 284 K/UL (ref 150–450)
PMV BLD AUTO: 9.7 FL (ref 9.4–12.3)
POTASSIUM SERPL-SCNC: 3.8 MMOL/L (ref 3.5–5.1)
PROT SERPL-MCNC: 7 G/DL (ref 6.3–8.2)
RBC # BLD AUTO: 3.89 M/UL (ref 4.23–5.6)
SODIUM SERPL-SCNC: 143 MMOL/L (ref 133–143)
WBC # BLD AUTO: 9.6 K/UL (ref 4.3–11.1)

## 2023-05-17 PROCEDURE — 0DBK8ZZ EXCISION OF ASCENDING COLON, VIA NATURAL OR ARTIFICIAL OPENING ENDOSCOPIC: ICD-10-PCS | Performed by: INTERNAL MEDICINE

## 2023-05-17 PROCEDURE — 3700000001 HC ADD 15 MINUTES (ANESTHESIA): Performed by: INTERNAL MEDICINE

## 2023-05-17 PROCEDURE — 6360000002 HC RX W HCPCS: Performed by: NURSE ANESTHETIST, CERTIFIED REGISTERED

## 2023-05-17 PROCEDURE — C1889 IMPLANT/INSERT DEVICE, NOC: HCPCS | Performed by: INTERNAL MEDICINE

## 2023-05-17 PROCEDURE — 2580000003 HC RX 258: Performed by: HOSPITALIST

## 2023-05-17 PROCEDURE — 88305 TISSUE EXAM BY PATHOLOGIST: CPT

## 2023-05-17 PROCEDURE — 2709999900 HC NON-CHARGEABLE SUPPLY: Performed by: INTERNAL MEDICINE

## 2023-05-17 PROCEDURE — 6360000002 HC RX W HCPCS: Performed by: NURSE PRACTITIONER

## 2023-05-17 PROCEDURE — 3700000000 HC ANESTHESIA ATTENDED CARE: Performed by: INTERNAL MEDICINE

## 2023-05-17 PROCEDURE — 6370000000 HC RX 637 (ALT 250 FOR IP): Performed by: NURSE PRACTITIONER

## 2023-05-17 PROCEDURE — 99231 SBSQ HOSP IP/OBS SF/LOW 25: CPT | Performed by: NURSE PRACTITIONER

## 2023-05-17 PROCEDURE — 7100000010 HC PHASE II RECOVERY - FIRST 15 MIN: Performed by: INTERNAL MEDICINE

## 2023-05-17 PROCEDURE — 80053 COMPREHEN METABOLIC PANEL: CPT

## 2023-05-17 PROCEDURE — 3609010600 HC COLONOSCOPY POLYPECTOMY SNARE/COLD BIOPSY: Performed by: INTERNAL MEDICINE

## 2023-05-17 PROCEDURE — 2580000003 HC RX 258: Performed by: NURSE ANESTHETIST, CERTIFIED REGISTERED

## 2023-05-17 PROCEDURE — 36415 COLL VENOUS BLD VENIPUNCTURE: CPT

## 2023-05-17 PROCEDURE — 2500000003 HC RX 250 WO HCPCS: Performed by: NURSE ANESTHETIST, CERTIFIED REGISTERED

## 2023-05-17 PROCEDURE — 7100000011 HC PHASE II RECOVERY - ADDTL 15 MIN: Performed by: INTERNAL MEDICINE

## 2023-05-17 PROCEDURE — 0DBP8ZX EXCISION OF RECTUM, VIA NATURAL OR ARTIFICIAL OPENING ENDOSCOPIC, DIAGNOSTIC: ICD-10-PCS | Performed by: INTERNAL MEDICINE

## 2023-05-17 PROCEDURE — 85025 COMPLETE CBC W/AUTO DIFF WBC: CPT

## 2023-05-17 PROCEDURE — 6360000002 HC RX W HCPCS: Performed by: HOSPITALIST

## 2023-05-17 RX ORDER — PROPOFOL 10 MG/ML
INJECTION, EMULSION INTRAVENOUS PRN
Status: DISCONTINUED | OUTPATIENT
Start: 2023-05-17 | End: 2023-05-17 | Stop reason: SDUPTHER

## 2023-05-17 RX ORDER — SODIUM CHLORIDE, SODIUM LACTATE, POTASSIUM CHLORIDE, CALCIUM CHLORIDE 600; 310; 30; 20 MG/100ML; MG/100ML; MG/100ML; MG/100ML
INJECTION, SOLUTION INTRAVENOUS CONTINUOUS PRN
Status: DISCONTINUED | OUTPATIENT
Start: 2023-05-17 | End: 2023-05-17 | Stop reason: SDUPTHER

## 2023-05-17 RX ORDER — PSEUDOEPHEDRINE HCL 30 MG
100 TABLET ORAL 2 TIMES DAILY
Qty: 60 CAPSULE | Refills: 1 | Status: SHIPPED | OUTPATIENT
Start: 2023-05-17 | End: 2023-06-16

## 2023-05-17 RX ORDER — PROPOFOL 10 MG/ML
INJECTION, EMULSION INTRAVENOUS CONTINUOUS PRN
Status: DISCONTINUED | OUTPATIENT
Start: 2023-05-17 | End: 2023-05-17 | Stop reason: SDUPTHER

## 2023-05-17 RX ORDER — LIDOCAINE HYDROCHLORIDE 20 MG/ML
INJECTION, SOLUTION EPIDURAL; INFILTRATION; INTRACAUDAL; PERINEURAL PRN
Status: DISCONTINUED | OUTPATIENT
Start: 2023-05-17 | End: 2023-05-17 | Stop reason: SDUPTHER

## 2023-05-17 RX ORDER — FENTANYL CITRATE 50 UG/ML
INJECTION, SOLUTION INTRAMUSCULAR; INTRAVENOUS PRN
Status: DISCONTINUED | OUTPATIENT
Start: 2023-05-17 | End: 2023-05-17 | Stop reason: SDUPTHER

## 2023-05-17 RX ADMIN — LIDOCAINE HYDROCHLORIDE 80 MG: 20 INJECTION, SOLUTION EPIDURAL; INFILTRATION; INTRACAUDAL; PERINEURAL at 15:04

## 2023-05-17 RX ADMIN — LEVOTHYROXINE SODIUM 200 MCG: 0.1 TABLET ORAL at 05:54

## 2023-05-17 RX ADMIN — PIPERACILLIN AND TAZOBACTAM 3375 MG: 3; .375 INJECTION, POWDER, LYOPHILIZED, FOR SOLUTION INTRAVENOUS at 08:40

## 2023-05-17 RX ADMIN — FENTANYL CITRATE 50 MCG: 50 INJECTION, SOLUTION INTRAMUSCULAR; INTRAVENOUS at 15:15

## 2023-05-17 RX ADMIN — PROPOFOL 80 MG: 10 INJECTION, EMULSION INTRAVENOUS at 15:04

## 2023-05-17 RX ADMIN — PROPOFOL 200 MCG/KG/MIN: 10 INJECTION, EMULSION INTRAVENOUS at 15:04

## 2023-05-17 RX ADMIN — LORAZEPAM 1 MG: 2 INJECTION, SOLUTION INTRAMUSCULAR; INTRAVENOUS at 11:56

## 2023-05-17 RX ADMIN — PIPERACILLIN AND TAZOBACTAM 3375 MG: 3; .375 INJECTION, POWDER, LYOPHILIZED, FOR SOLUTION INTRAVENOUS at 01:29

## 2023-05-17 RX ADMIN — FENTANYL CITRATE 50 MCG: 50 INJECTION, SOLUTION INTRAMUSCULAR; INTRAVENOUS at 15:12

## 2023-05-17 RX ADMIN — PROPOFOL 40 MG: 10 INJECTION, EMULSION INTRAVENOUS at 15:11

## 2023-05-17 RX ADMIN — HYDROMORPHONE HYDROCHLORIDE 1 MG: 1 INJECTION, SOLUTION INTRAMUSCULAR; INTRAVENOUS; SUBCUTANEOUS at 08:41

## 2023-05-17 RX ADMIN — AMLODIPINE BESYLATE 10 MG: 10 TABLET ORAL at 08:39

## 2023-05-17 RX ADMIN — PROPOFOL 20 MG: 10 INJECTION, EMULSION INTRAVENOUS at 15:07

## 2023-05-17 RX ADMIN — LORAZEPAM 1 MG: 2 INJECTION, SOLUTION INTRAMUSCULAR; INTRAVENOUS at 05:46

## 2023-05-17 RX ADMIN — SODIUM CHLORIDE, PRESERVATIVE FREE 10 ML: 5 INJECTION INTRAVENOUS at 08:40

## 2023-05-17 RX ADMIN — HYDROMORPHONE HYDROCHLORIDE 1 MG: 1 INJECTION, SOLUTION INTRAMUSCULAR; INTRAVENOUS; SUBCUTANEOUS at 11:35

## 2023-05-17 RX ADMIN — SODIUM CHLORIDE, SODIUM LACTATE, POTASSIUM CHLORIDE, AND CALCIUM CHLORIDE: 600; 310; 30; 20 INJECTION, SOLUTION INTRAVENOUS at 14:54

## 2023-05-17 RX ADMIN — PHENYLEPHRINE HYDROCHLORIDE 100 MCG: 0.1 INJECTION, SOLUTION INTRAVENOUS at 15:29

## 2023-05-17 RX ADMIN — HYDROMORPHONE HYDROCHLORIDE 1 MG: 1 INJECTION, SOLUTION INTRAMUSCULAR; INTRAVENOUS; SUBCUTANEOUS at 04:50

## 2023-05-17 RX ADMIN — PHENYLEPHRINE HYDROCHLORIDE 100 MCG: 0.1 INJECTION, SOLUTION INTRAVENOUS at 15:35

## 2023-05-17 ASSESSMENT — PAIN SCALES - GENERAL
PAINLEVEL_OUTOF10: 6
PAINLEVEL_OUTOF10: 8
PAINLEVEL_OUTOF10: 10
PAINLEVEL_OUTOF10: 8

## 2023-05-17 ASSESSMENT — LIFESTYLE VARIABLES: SMOKING_STATUS: 0

## 2023-05-17 ASSESSMENT — PAIN DESCRIPTION - LOCATION
LOCATION: ABDOMEN
LOCATION: ABDOMEN

## 2023-05-17 NOTE — PROGRESS NOTES
Accessed pt chart to assist primary RN.
Discharge instructions reviewed with Pt. Opportunity for questions and clarification given. IV removed and cath tip intact. Scripts sent to pharmacy / sent with pt. Education given to pt and pt was able to teach back. No needs at this time and pt waiting on ride to arrive.
Gayla Rosen (:  1970) is a 47 y.o. male, new patient here for evaluation of the following chief complaint(s):  Abdominal Pain         ASSESSMENT/PLAN:  [unfilled]    1. Chronic constipation. 2.  Fluid and stool in the colon which raise the question of colitis per CT. 3.  Reported history of UC by the patient but he has not been on any treatment. Recommendations: We will proceed with colonoscopy tomorrow to assess for any evidence of colitis or quiescent colitis related to his reported ulcerative colitis. Patient was advised possible complications with colonoscopy including the adverse events of anesthesia, cardiac and pulmonary, risk of bleeding, infection and perforation. Subjective   SUBJECTIVE/OBJECTIVE  Patient had a few bowel movements today of small size. He denies rectal bleeding. He has generalized abdominal discomfort. He indicates that he was diagnosed with UC more than 2 decades ago and he has not been on any treatment. Past Medical History:   Diagnosis Date    Crohn's disease ([facility] [address]        Past Surgical History:   Procedure Laterality Date    ABDOMINAL WALL SURGERY N/A     Pyloric stenosis as a child at 7 months of age    HIP PINNING Left     from ski accident    ORIF TIBIA & FIBULA FRACTURES Left     from ski accident             Allergies   Allergen Reactions    Reglan [Metoclopramide] Shortness Of Breath    Amitriptyline      \"Delirious\"          Review of Systems  As per HPI. Objective   Physical Exam:  Alert oriented x3 not in distress. Lungs: Clear to auscultation. Abdomen: Soft, no guarding or tenderness.          Current Facility-Administered Medications   Medication Dose Route Frequency Provider Last Rate Last Admin    docusate sodium (COLACE) capsule 100 mg  100 mg Oral BID CHRISTI Britt - CNP   100 mg at 23 0854    amLODIPine (NORVASC) tablet 10 mg  10 mg Oral Daily Irene Shah, APRN - CNP   10 mg at 23 6027
General Surgery Progress Note    2023    Admit Date: 5/15/2023    Chief Complaint: Abdominal Pain        Estela Pierre  MRN: [id number]  :1970  Age:52 y.o.     HPI: Estela Pierre is a 47 y.o. male who we are asked by Hospitalist MD  to see for Abdominal Pain. Patient presented to ED on 2023 with complaints of abdominal pain, nausea and vomiting. Patient reports he has history of ulcerative colitis when he was in his late 19's but has not a flare up since that time. He also has history of Hypertension and Type 2 Diabetes. Patient reports abdominal pain started 3 days ago and progressively worsened the past 2 days. He reports sharp increase in pain and multiple episodes of nausea and vomiting with yellow appearing emesis and lack of bowel movement for the past 5 days. He does reports he has been passing flatus. He denies any fever or chills. He states he took phenergan at home and half of an oxycodone which he takes for chronic hip pain secondary to a previous ski accident . He denies any improvement of symptoms with medications. Patient was started on IVF's and IV Zosyn. General surgery team as well as GI MD consultation were ordered for evaluation and treatment. Surgical history includes  pyloric stenosis repair as a child and left hip fracture and left tibia and fibula fracture repaired from ski accident. The history is provided by the patient and the spouse. No  was used. ED workup done includes   CT scan Abdomen/Pelvis: 5/15/2023     CT OF THE ABDOMEN AND PELVIS WITH CONTRAST     Clinical indication: severe abdominal pain, n/v/constipation     Comparison: None. Procedure: 100 mL Isovue-370 was administered intravenously without incident. CT   images of the abdomen and pelvis were obtained.   CT dose lowering techniques   were used, to include: automated exposure control, adjustment for patient size,   and or use of iterative
Hospitalist Progress Note   Admit Date:  5/15/2023  1:06 AM   Name:  Gayla Rosen   Age:  47 y.o. Sex:  male  :  1970   MRN:  [id number]   Room:      Presenting Complaint: Abdominal Pain     Reason(s) for Admission: Colitis [K52.9]  Acute colitis [K52.9]  Severe sepsis ([address] [A41.9, R65.20]     Hospital Course:   Gayla Rosen is a 47years old gentleman with history of ulcerative colitis when he was in his late 25s, no flares since that time, history of hypertension, unmedicated diabetes type 2, who presented to the emergency room with chief complaint of nausea, vomiting, abdominal pain on and off going on for past 4 to 5 days duration. Patient reports symptoms continue to get worse, frequency of vomitus getting worse, turning yellow. Patient reports he is constipated severely. Patient reports on pain medications chronically for chronic hip pain secondary to accident. Abdominal pain is lower quadrant. Evaluated in the emergency room, CT scan of abdomen was obtained shows evidence of colitis; infectious versus inflammatory. Also distended gallbladder without additional findings of acute inflammation. Normal appendix. Antibiotics. Lab work shows evidence of elevated white count. Gastric consultation was also requested in ER requested admission. Denies any fever, chills but patient was in severe pain, requiring multiple dose of pain medications in the emergency room. Patient looks very anxious emergency room. GI started patient on proper bowel program including Colace, MiraLAX, suppositories. Once he is cleaned out, GI can hopefully proceed with colonoscopy. Patient states he has had several colonoscopies in the past.  Also seen by surgery in consultation secondary to the abdominal pain with distended gallbladder. At this time there is nothing for them to do. Recommended Zosyn and deferred to GI for colonoscopy. Hospitalist asked to admit.        Subjective & 24hr Events
Patient's /94. Giving labetalol 10mg per PRN order (give T4Q for systolic >216). Informed Page EDILBERTO Christianson.  Will continue to monitor
Patient's BP still at high at 175/85. Patient is in a significant amount of pain. Order parameters for BP PRN medication have changed. Will continue to monitor, if BP >180, will give PRN medication.
Per MD verbal orders soap suds enema given prior to Prep,  will begin dev in 1 hour.
Please see admitting H&P for details of presentation. In brief, Mr. Catalina Pedersen is a 46 y/o CM w/ a PMH of ulcerative colitis when he was in his late 25s (no flares since that time), HTN, hypothyroidism, DM type 2 who presented to the ER w/ chief complaints of N/V, abd pain, and constipation. In the ER, CT abd/pel showed constipation and suggestion of inflammatory or infectious process. The patient stated that he is on chronic pain medications at home for hip pain. He was started on Zosyn and IVF. He was admitted to the hospitalist service on May 15. GI and general surgery were consulted. UA and CXR non infectious. Blood cultures collected. WBC 29.7. The patient was seen this morning. He was doubled over on the side of the bed rocking back and forth due to abdominal pain. Pain medications have been adjusted. Ask GI if methylnaltrexone can be considered. We will follow-up on their recommendations. We will continue to monitor this patient closely.
Prep started
TRANSFER - IN REPORT:    Verbal report received from Deshaun Hoang RN on [address]  being received from [address] for routine progression of patient care      Report consisted of patient's Situation, Background, Assessment and   Recommendations(SBAR). Information from the following report(s) Nurse Handoff Report, ED SBAR, [facility], Recent Results, Neuro Assessment, and Event Log was reviewed with the receiving nurse. Opportunity for questions and clarification was provided. Assessment to be completed upon patient's arrival to unit and care to be assumed.
TRANSFER - IN REPORT:    Verbal report received from [place], [facility] on [address]  being received from  for ordered procedure      Report consisted of patient's Situation, Background, Assessment and   Recommendations(SBAR). Information from the following report(s) Nurse Handoff Report was reviewed with the receiving nurse. Opportunity for questions and clarification was provided. Assessment completed upon patient's arrival to unit and care assumed.
Breath sounds: Normal breath sounds and air entry. No stridor. No decreased breath sounds, wheezing, rhonchi or rales. Abdominal:      General: Abdomen is soft with hypoactive bowel sounds. There is minimal  distention noted      Palpations: There is no mass. Tenderness: There is less abdominal tenderness in the periumbilical area and left lower quadrant. There is no right CVA tenderness, left CVA tenderness, guarding or rebound. Negative signs include Mccloud's sign and McBurney's sign. Hernia: No hernia is present. Comments: Overall the abdomen is soft to palpation with minimal   distention. Musculoskeletal:         General: No swelling, tenderness or deformity. Normal range of motion. Cervical back: Normal range of motion. Skin:     General: Skin is warm. Capillary Refill: Capillary refill takes less than 2 seconds. Neurological:      General: No focal deficit present. Mental Status: He is alert. Psychiatric:         Mood and Affect: Mood is calm.           Data Review    Recent Results (from the past 24 hour(s))   CBC with Auto Differential    Collection Time: 05/17/23  5:32 AM   Result Value Ref Range    WBC 9.6 4.3 - 11.1 K/uL    RBC 3.89 (L) 4.23 - 5.6 M/uL    Hemoglobin 11.8 (L) 13.6 - 17.2 g/dL    Hematocrit 35.2 (L) 41.1 - 50.3 %    MCV 90.5 82.0 - 102.0 FL    MCH 30.3 26.1 - 32.9 PG    MCHC 33.5 31.4 - 35.0 g/dL    RDW 15.1 (H) 11.9 - 14.6 %    Platelets 168 356 - 923 K/uL    MPV 9.7 9.4 - 12.3 FL    nRBC 0.00 0.0 - 0.2 K/uL    Differential Type AUTOMATED      Neutrophils % 64 43 - 78 %    Lymphocytes % 24 13 - 44 %    Monocytes % 10 4.0 - 12.0 %    Eosinophils % 1 0.5 - 7.8 %    Basophils % 1 0.0 - 2.0 %    Immature Granulocytes 0 0.0 - 5.0 %    Neutrophils Absolute 6.1 1.7 - 8.2 K/UL    Lymphocytes Absolute 2.3 0.5 - 4.6 K/UL    Monocytes Absolute 1.0 0.1 - 1.3 K/UL    Eosinophils Absolute 0.1 0.0 - 0.8 K/UL    Basophils Absolute 0.1 0.0 - 0.2 K/UL    Absolute

## 2023-05-17 NOTE — PERIOP NOTE
MD Clara Raman   at bedside with patient. Pt VSS stable. Pain and Nausea controlled at this time. Verbal sign out per MD when pacu care is completed. Plan of care continues. TRANSFER - OUT REPORT:    Verbal report given to KAITLIN Gong on [employer]  being transferred to room 365 for routine progression of patient care       Report consisted of patient's Situation, Background, Assessment and   Recommendations(SBAR). Information from the following report(s) Nurse Handoff Report and Adult Overview was reviewed with the receiving nurse. [place] Assessment: Presents to emergency department  because of falls (Syncope, seizure, or loss of consciousness): No, Age > 79: No, Altered Mental Status, Intoxication with alcohol or substance confusion (Disorientation, impaired judgment, poor safety awaremess, or inability to follow instructions): No, Impaired Mobility: Ambulates or transfers with assistive devices or assistance; Unable to ambulate or transer.: No, Nursing Judgement: No  Lines:   Peripheral IV 05/15/23 Distal;Left Cephalic (Active)   Site Assessment Clean, dry & intact 05/17/23 1547   Line Status Infusing 05/17/23 [address] Connections checked and tightened 05/17/23 0927   Phlebitis Assessment No symptoms 05/17/23 1547   Infiltration Assessment 0 05/17/23 0927   Alcohol Cap Used Yes 05/17/23 1547   Dressing Status Clean, dry & intact 05/17/23 1547   Dressing Type Transparent 05/17/23 1547        Opportunity for questions and clarification was provided.       Patient transported with:  O2 @ 0lpm

## 2023-05-17 NOTE — ANESTHESIA PRE PROCEDURE
Department of Anesthesiology  Preprocedure Note       Name:  Adolfo Bey   Age:  47 y.o.  :  1970                                          MRN:  [id number]         Date:  2023      Surgeon: Yaneli Pan):  Colby Habermann, MD    Procedure: Procedure(s):  COLONOSCOPY DIAGNOSTIC    Medications prior to admission:   Prior to Admission medications    Medication Sig Start Date End Date Taking? Authorizing Provider   oxyCODONE 5 MG capsule Take 4 capsules by mouth in the morning, at noon, in the evening, and at bedtime. Max Daily Amount: 80 mg   Yes Historical Provider, MD   promethazine (PHENERGAN) 25 MG tablet Take 1 tablet by mouth daily   Yes Historical Provider, MD   levothyroxine (SYNTHROID) 200 MCG tablet Take 1 tablet by mouth Daily   Yes Historical Provider, MD   amLODIPine (NORVASC) 10 MG tablet Take 1 tablet by mouth daily   Yes Historical Provider, MD   RABEprazole (ACIPHEX) 20 MG tablet Take 1 tablet by mouth daily   Yes Historical Provider, MD   temazepam (RESTORIL) 15 MG capsule Take 2 capsules by mouth at bedtime.  Max Daily Amount: 30 mg   Yes Historical Provider, MD       Current medications:    Current Facility-Administered Medications   Medication Dose Route Frequency Provider Last Rate Last Admin    docusate sodium (COLACE) capsule 100 mg  100 mg Oral BID CHRISTI Tay CNP   100 mg at 23 0854    amLODIPine (NORVASC) tablet 10 mg  10 mg Oral Daily CHRISTI Tay CNP   10 mg at 23 6781    levothyroxine (SYNTHROID) tablet 200 mcg  200 mcg Oral Daily CHRISTI Tay CNP   200 mcg at 23 0554    0.9 % sodium chloride bolus  1,000 mL IntraVENous Once [employer], DO        sodium chloride flush 0.9 % injection 5-40 mL  5-40 mL IntraVENous 2 times per day Magaly Mcneal MD   10 mL at 23 0840    sodium chloride flush 0.9 % injection 5-40 mL  5-40 mL IntraVENous PRN Dino Abdi MD   10 mL at 05/15/23 0534    0.9 % sodium

## 2023-05-17 NOTE — CARE COORDINATION
0940: Discharge planning was discussed with the General Surgery NP. The patient is pending a colonoscopy.     1000: Discharge planning was discussed with the Interdisciplinary Team.

## 2023-05-17 NOTE — PROCEDURES
[unfilled]  Colonoscopy Note    Operative Report    Patient: Amarilis Jean-Baptiste MRN: [id number]      YOB: 1970  Age: 47 y.o. Sex: male            Indications: Constipation, left lower quadrant abdominal pain. The patient reported history of ulcerative colitis but he is not on any treatment for UC. Preoperative Evaluation: The patient was evaluated prior to the procedure in the GI lab admission area, the patient ASA was recorded . Consent was obtained from the patient with the risk of perforation bleeding and aspiration. Procedure: The colon scope was advanced under direct vision from the rectum to the cecum. The cecum identified ileocecal valve and appendiceal orifice. Careful exam the of the colon was done upon withdrawal of the scope. I reexamined the ascending colon back-and-forth x3. Prep score: Tarzan 8. Blood loss: Negligible. Anesthesia: HANK-per anesthesia    Complications: None; patient tolerated the procedure well. EBL -insignificant    Findings:  Cecum: Normal.  Ascending colon: Semisessile polyp in the mid ascending colon, size 13 mm, removed with hot snare. 1 Hemoclip was applied post polypectomy to reduce risk of post polypectomy bleeding. The colonic mucosa in the ascending colon otherwise was normal.  Transverse colon: Normal.  Descending colon: Normal.  Sigmoid colon is involved with hyperemia and edema with moderate tortuosity. Rectum: Is involved with hyperemia and mild edema that could be secondary to primary proctitis versus artifact from an enema that he received. Random colon biopsies were obtained from the right colon, left colon, sigmoid and rectum to screen for dysplasia. Postoperative Diagnosis:   Proctosigmoiditis of mild degree. Differential diagnosis should include ulcerative colitis versus stercoral colitis/proctitis. Moderately tortuous sigmoid. Recommendations:   1. Follow-up pathology.   2.  Consider repeat

## 2023-05-17 NOTE — DISCHARGE SUMMARY
MG CAPS Take 100 mg by mouth 2 times daily  Qty: 60 capsule, Refills: 1           CONTINUE these medications which have NOT CHANGED    Details   oxyCODONE 5 MG capsule Take 4 capsules by mouth in the morning, at noon, in the evening, and at bedtime. Max Daily Amount: 80 mg      promethazine (PHENERGAN) 25 MG tablet Take 1 tablet by mouth daily      levothyroxine (SYNTHROID) 200 MCG tablet Take 1 tablet by mouth Daily      amLODIPine (NORVASC) 10 MG tablet Take 1 tablet by mouth daily      RABEprazole (ACIPHEX) 20 MG tablet Take 1 tablet by mouth daily      temazepam (RESTORIL) 15 MG capsule Take 2 capsules by mouth at bedtime. Max Daily Amount: 30 mg             Some medications may have been reported old/obsolete and marked \"stop taking\" by the system; in reality pt was already off these meds; defer to outpatient or prescribing providers. Procedures done this admission:  Procedure(s):  COLONOSCOPY POLYPECTOMY hot SNARE and BIOPSY and clip    Consults this admission:  IP CONSULT TO GI  IP CONSULT TO GENERAL SURGERY    Echocardiogram results:  No results found for this or any previous visit. Diagnostic Imaging/Tests:   CT ABDOMEN PELVIS W IV CONTRAST Additional Contrast? None    Result Date: 5/15/2023  Impression: 1. Fluid and stool seen throughout the colon, suggestive of an infectious or inflammatory colitis. 2.  Normal appendix. 3.  Distended gallbladder without additional findings of acute inflammation. Enoch Santos D.O. 5/15/2023 3:11:00 AM    XR CHEST PORTABLE    Result Date: 5/15/2023  No acute cardiopulmonary abnormality.   Edward Rogers M.D. 5/15/2023 4:05:00 AM       Labs: Results:       BMP, Mg, Phos Recent Labs     05/15/23  0126 05/16/23  0540 05/17/23  0532    139 143   K 4.4 3.6 3.8    110 110   CO2 19* 24 24   ANIONGAP 8 5 9   BUN 28* 13 13   CREATININE 0.97 0.77* 0.69*   LABGLOM >60 >60 >60   CALCIUM 9.8 8.9 8.8   GLUCOSE 148* 109* 88   MG 2.3  --   --       CBC Recent Labs

## 2023-05-17 NOTE — ANESTHESIA POSTPROCEDURE EVALUATION
Department of Anesthesiology  Postprocedure Note    Patient: Olaf Carr  MRN: [id number]  YOB: 1970  Date of evaluation: 5/17/2023      Procedure Summary     Date: 05/17/23 Room / Location: [facility] ENDO 01 / [facility] ENDOSCOPY    Anesthesia Start: 1454 Anesthesia Stop: 4675    Procedure: COLONOSCOPY POLYPECTOMY hot SNARE and BIOPSY and clip Diagnosis:       Constipation, unspecified constipation type      Colitis      (Constipation, unspecified constipation type [K59.00])      (Colitis [K52.9])    Surgeons: Lucero Hauser MD Responsible Provider: Adeola Godoy MD    Anesthesia Type: TIVA ASA Status: 3          Anesthesia Type: No value filed.     Liz Phase I:      Liz Phase II: Liz Score: 10      Anesthesia Post Evaluation    Patient location during evaluation: PACU  Patient participation: complete - patient participated  Level of consciousness: awake and alert  Airway patency: patent  Nausea & Vomiting: no nausea and no vomiting  Complications: no  Cardiovascular status: hemodynamically stable  Respiratory status: acceptable, nonlabored ventilation and spontaneous ventilation  Hydration status: euvolemic  Comments: /82   Pulse 77   Temp 98 °F (36.7 °C) (Temporal)   Resp 18   Ht 5' 8\" (1.727 m)   Wt 170 lb (77.1 kg)   SpO2 96%   BMI 25.85 kg/m²     Multimodal analgesia pain management approach

## 2023-05-19 NOTE — CARE COORDINATION
05/19/23 1211   Service Assessment   Patient Orientation Alert and Oriented   Cognition Alert   Primary Caregiver Self   Support Systems Spouse/Significant Other   Patient's Healthcare Decision Maker is: Legal Next of Kin   Prior Functional Level Independent in ADLs/IADLs   Current Functional Level Independent in ADLs/IADLs   Can patient return to prior living arrangement Yes   Financial Resources Other (Comment)  (BCBS)   Social/Functional History   Lives With Spouse   Type of [address], [address] Help From Family   ADL Assistance Independent   Ambulation Assistance Independent   Transfer Assistance Independent   Active  Yes   Mode of Transportation Car   Discharge Planning   Type of Residence House   Living Arrangements Spouse/Significant Other   Current Services Prior To Admission Durable Medical Equipment   Current DME Prior to [address] Needed N/A   DME Ordered? No   Potential Assistance Purchasing Medications No   Type of Home Care Services None   [facility]   Services At/After Discharge None   Condition of Participation: Discharge Planning   The Plan for Transition of Care is related to the following treatment goals:  --      The patient discharged home with his spouse. No case management needs were identified.

## 2023-05-20 LAB
BACTERIA SPEC CULT: NORMAL
BACTERIA SPEC CULT: NORMAL
SERVICE CMNT-IMP: NORMAL
SERVICE CMNT-IMP: NORMAL

## 2023-06-06 ENCOUNTER — OFFICE VISIT (OUTPATIENT)
Dept: GASTROENTEROLOGY | Age: 53
End: 2023-06-06
Payer: COMMERCIAL

## 2023-06-06 VITALS
WEIGHT: 158 LBS | SYSTOLIC BLOOD PRESSURE: 135 MMHG | OXYGEN SATURATION: 94 % | DIASTOLIC BLOOD PRESSURE: 99 MMHG | RESPIRATION RATE: 16 BRPM | HEART RATE: 119 BPM | TEMPERATURE: 97.9 F | BODY MASS INDEX: 23.95 KG/M2 | HEIGHT: 68 IN

## 2023-06-06 DIAGNOSIS — K52.9 COLITIS: Primary | ICD-10-CM

## 2023-06-06 PROCEDURE — 3075F SYST BP GE 130 - 139MM HG: CPT | Performed by: PHYSICIAN ASSISTANT

## 2023-06-06 PROCEDURE — 99214 OFFICE O/P EST MOD 30 MIN: CPT | Performed by: PHYSICIAN ASSISTANT

## 2023-06-06 PROCEDURE — 3080F DIAST BP >= 90 MM HG: CPT | Performed by: PHYSICIAN ASSISTANT

## 2023-06-06 NOTE — ASSESSMENT & PLAN NOTE
CT abdomen/pelvis 5/15 that showed colon distended with fluid and stool, suggestive of infectious or inflammatory colitis. Colonoscopy 5/17 by Dr. Rubina Parra was notable for [facility] prep score 8 and 13 mm ascending colon polyp, moderately tortuous sigmoid, and mild proctosigmoid colitis. Differential included UC versus stercoral colitis/proctitis. Pathology showed TV adenoma, unremarkable random colon and sigmoid biopsies. Rectal biopsy pathology reviewed w/ Dr. Yasmany Alfred as well as photos from colonoscopy and felt to be consistent with submucosal hemorrhage related to chronic constipation versus NSAID overuse. Patient clinically is improved with no rectal bleeding, diarrhea, abdominal pain, and no significant ongoing constipation. He should discontinue NSAID use and discuss alternatives with his pain management doctor. In absence of pathology concerning for UC or clinical symptoms consistent with UC will defer fecal calprotectin testing. Follow-up 1 year for repeat colonoscopy or sooner if any issues arise with worsening diarrhea or rectal bleeding.

## 2023-06-06 NOTE — PROGRESS NOTES
Alanis Garcia (:  1970) is a 47 y.o. male new patient referred to our office for evaluation of the following chief complaint(s):  Follow-up           ASSESSMENT/PLAN:  1. Colitis  Assessment & Plan:  CT abdomen/pelvis 5/15 that showed colon distended with fluid and stool, suggestive of infectious or inflammatory colitis. Colonoscopy  by Dr. Eduardo Buchanan was notable for [facility] prep score 8 and 13 mm ascending colon polyp, moderately tortuous sigmoid, and mild proctosigmoid colitis. Differential included UC versus stercoral colitis/proctitis. Pathology showed TV adenoma, unremarkable random colon and sigmoid biopsies. Rectal biopsy pathology reviewed w/ Dr. Bhumika Cruz as well as photos from colonoscopy and felt to be consistent with submucosal hemorrhage related to chronic constipation versus NSAID overuse. Patient clinically is improved with no rectal bleeding, diarrhea, abdominal pain, and no significant ongoing constipation. He should discontinue NSAID use and discuss alternatives with his pain management doctor. In absence of pathology concerning for UC or clinical symptoms consistent with UC will defer fecal calprotectin testing. Follow-up 1 year for repeat colonoscopy or sooner if any issues arise with worsening diarrhea or rectal bleeding. Subjective   SUBJECTIVE/OBJECTIVE  Alanis Garcia is a 47y.o. year old male with PMH notable for HTN, DM, Grave's disease, and ulcerative colitis diagnosed in his 25s with no subsequent flares. Surgical history is pertinent for repair of pyloric stenosis as an infant and multiple orthopedic procedures. Patient was recently admitted 5/15 to  for nausea, vomiting, abdominal pain. During admission patient was evaluated by Dr. Bhumika Cruz for abdominal pain, nausea, vomiting, and abnormal CT abdomen/pelvis 5/15 that showed colon distended with fluid and stool, suggestive of infectious or inflammatory colitis.  Colonoscopy  by Dr. Eduardo Buchanan was notable for [facility]

## (undated) DEVICE — NEEDLE SYR 18GA L1.5IN RED PLAS HUB S STL BLNT FILL W/O

## (undated) DEVICE — SINGLE PORT MANIFOLD: Brand: NEPTUNE 2

## (undated) DEVICE — FORCEPS BX L240CM JAW DIA2.8MM L CAP W/ NDL MIC MESH TOOTH

## (undated) DEVICE — CONNECTOR TBNG OD5-7MM O2 END DISP

## (undated) DEVICE — KENDALL RADIOLUCENT FOAM MONITORING ELECTRODE RECTANGULAR SHAPE: Brand: KENDALL

## (undated) DEVICE — TRAP SPEC POLYP REM STRNR CLN DSGN MAGNIFYING WIND DISP

## (undated) DEVICE — SYRINGE MED 10ML LUERLOCK TIP W/O SFTY DISP

## (undated) DEVICE — CONTAINER FORMALIN PREFILLED 10% NBF 40ML

## (undated) DEVICE — SYRINGE, LUER SLIP, STERILE, 60ML: Brand: MEDLINE

## (undated) DEVICE — CANNULA NSL ORAL AD FOR CAPNOFLEX CO2 O2 AIRLFE

## (undated) DEVICE — ELECTRODE PT RET AD L9FT HI MOIST COND ADH HYDRGEL CORDED

## (undated) DEVICE — CLIP: Brand: RESOLUTION 360™ ULTRA CLIP

## (undated) DEVICE — GAUZE,SPONGE,4"X4",12PLY,WOVEN,NS,LF: Brand: MEDLINE

## (undated) DEVICE — AIRLIFE™ OXYGEN TUBING 7 FEET (2.1 M) CRUSH RESISTANT OXYGEN TUBING, VINYL TIPPED: Brand: AIRLIFE™

## (undated) DEVICE — YANKAUER,BULB TIP,W/O VENT,RIGID,STERILE: Brand: MEDLINE

## (undated) DEVICE — SNARE POLYP SM W13MMXL240CM SHTH DIA2.4MM OVL FLX DISP

## (undated) DEVICE — LUBE JELLY FOIL PACK 1.4 OZ: Brand: MEDLINE INDUSTRIES, INC.

## (undated) DEVICE — SYRINGE MED 3ML CLR PLAS STD N CTRL LUERLOCK TIP DISP